# Patient Record
Sex: FEMALE | Race: WHITE | Employment: OTHER | URBAN - METROPOLITAN AREA
[De-identification: names, ages, dates, MRNs, and addresses within clinical notes are randomized per-mention and may not be internally consistent; named-entity substitution may affect disease eponyms.]

---

## 2018-11-20 ENCOUNTER — HOSPITAL ENCOUNTER (OUTPATIENT)
Dept: PHYSICAL THERAPY | Age: 68
Setting detail: THERAPIES SERIES
Discharge: HOME | End: 2018-11-20
Payer: COMMERCIAL

## 2018-11-20 PROCEDURE — G8979 MOBILITY GOAL STATUS: HCPCS

## 2018-11-20 PROCEDURE — G8978 MOBILITY CURRENT STATUS: HCPCS

## 2018-11-20 PROCEDURE — 97162 PT EVAL MOD COMPLEX 30 MIN: CPT

## 2018-11-20 PROCEDURE — G8980 MOBILITY D/C STATUS: HCPCS

## 2018-11-20 ASSESSMENT — PAIN DESCRIPTION - DESCRIPTORS: DESCRIPTORS: ACHING;SORE;CONSTANT

## 2018-11-20 ASSESSMENT — PAIN SCALES - GENERAL: PAINLEVEL_OUTOF10: 7

## 2018-11-20 ASSESSMENT — PAIN DESCRIPTION - LOCATION: LOCATION: NECK;BACK;SHOULDER;ARM

## 2019-06-13 LAB
ALBUMIN: 4.3 G/DL (ref 3.4–5)
ALP BLD-CCNC: 75 U/L (ref 33–136)
ALT SERPL-CCNC: 21 U/L (ref 7–45)
ANION GAP SERPL CALCULATED.3IONS-SCNC: 16 MMOL/L (ref 10–20)
AST SERPL-CCNC: 23 U/L (ref 9–39)
BICARBONATE: 28 MMOL/L (ref 21–32)
BILIRUB SERPL-MCNC: 0.4 MG/DL (ref 0–1.2)
BILIRUBIN DIRECT: 0.1 MG/DL (ref 0–0.3)
CALCIUM SERPL-MCNC: 9.9 MG/DL (ref 8.6–10.3)
CHLORIDE BLD-SCNC: 107 MMOL/L (ref 98–107)
CHOLESTEROL/HDL RATIO: 2.2
CHOLESTEROL: 152 MG/DL (ref 0–199)
CREAT SERPL-MCNC: 0.82 MG/DL (ref 0.5–1)
ERYTHROCYTE [DISTWIDTH] IN BLOOD BY AUTOMATED COUNT: 14.7 % (ref 11.5–14)
GFR AFRICAN AMERICAN: >60 ML/MIN/1.73M2
GFR NON-AFRICAN AMERICAN: >60 ML/MIN/1.73M2
GLUCOSE: 103 MG/DL (ref 74–99)
HCT VFR BLD CALC: 45.8 % (ref 36–46)
HDLC SERPL-MCNC: 70 MG/DL
HEMOGLOBIN: 14.1 G/DL (ref 12–16)
LDL CHOLESTEROL: 57 MG/DL (ref 0–99)
MCHC RBC AUTO-ENTMCNC: 30.8 G/DL (ref 32–36)
MCV RBC AUTO: 92 FL (ref 80–100)
PLATELET # BLD: 260 X10E9/L (ref 150–450)
POTASSIUM SERPL-SCNC: 4.5 MMOL/L (ref 3.5–5.3)
RBC # BLD: 4.96 X10E12/L (ref 4–5.2)
SODIUM BLD-SCNC: 146 MMOL/L (ref 136–145)
TOTAL PROTEIN: 7.1 G/DL (ref 6.4–8.2)
TRIGL SERPL-MCNC: 124 MG/DL (ref 0–149)
UREA NITROGEN: 18 MG/DL (ref 6–23)
VLDLC SERPL CALC-MCNC: 25 MG/DL (ref 0–40)
WBC: 10.4 X10E9/L (ref 4.4–11.3)

## 2019-12-02 DIAGNOSIS — G35 MULTIPLE SCLEROSIS (HCC): Primary | ICD-10-CM

## 2019-12-02 RX ORDER — DALFAMPRIDINE 10 MG/1
10 TABLET, FILM COATED, EXTENDED RELEASE ORAL EVERY 12 HOURS
Qty: 60 TABLET | Refills: 5 | Status: SHIPPED | OUTPATIENT
Start: 2019-12-02 | End: 2020-06-01

## 2019-12-18 RX ORDER — BACLOFEN 10 MG/1
10 TABLET ORAL 4 TIMES DAILY
Qty: 120 TABLET | Refills: 2 | Status: SHIPPED | OUTPATIENT
Start: 2019-12-18 | End: 2020-03-25

## 2020-02-17 ENCOUNTER — OFFICE VISIT (OUTPATIENT)
Dept: NEUROLOGY | Age: 70
End: 2020-02-17
Payer: COMMERCIAL

## 2020-02-17 VITALS — WEIGHT: 148.7 LBS

## 2020-02-17 PROBLEM — R26.0 ATAXIC GAIT: Status: ACTIVE | Noted: 2020-02-17

## 2020-02-17 PROBLEM — R32 INCONTINENCE: Status: ACTIVE | Noted: 2020-02-17

## 2020-02-17 PROBLEM — R41.3 MEMORY LOSS: Status: ACTIVE | Noted: 2020-02-17

## 2020-02-17 PROCEDURE — G8400 PT W/DXA NO RESULTS DOC: HCPCS | Performed by: PSYCHIATRY & NEUROLOGY

## 2020-02-17 PROCEDURE — 99214 OFFICE O/P EST MOD 30 MIN: CPT | Performed by: PSYCHIATRY & NEUROLOGY

## 2020-02-17 PROCEDURE — G8421 BMI NOT CALCULATED: HCPCS | Performed by: PSYCHIATRY & NEUROLOGY

## 2020-02-17 PROCEDURE — 3017F COLORECTAL CA SCREEN DOC REV: CPT | Performed by: PSYCHIATRY & NEUROLOGY

## 2020-02-17 PROCEDURE — 4004F PT TOBACCO SCREEN RCVD TLK: CPT | Performed by: PSYCHIATRY & NEUROLOGY

## 2020-02-17 PROCEDURE — 1123F ACP DISCUSS/DSCN MKR DOCD: CPT | Performed by: PSYCHIATRY & NEUROLOGY

## 2020-02-17 PROCEDURE — 4040F PNEUMOC VAC/ADMIN/RCVD: CPT | Performed by: PSYCHIATRY & NEUROLOGY

## 2020-02-17 PROCEDURE — 0509F URINE INCON PLAN DOCD: CPT | Performed by: PSYCHIATRY & NEUROLOGY

## 2020-02-17 PROCEDURE — G8484 FLU IMMUNIZE NO ADMIN: HCPCS | Performed by: PSYCHIATRY & NEUROLOGY

## 2020-02-17 PROCEDURE — 1090F PRES/ABSN URINE INCON ASSESS: CPT | Performed by: PSYCHIATRY & NEUROLOGY

## 2020-02-17 PROCEDURE — G8427 DOCREV CUR MEDS BY ELIG CLIN: HCPCS | Performed by: PSYCHIATRY & NEUROLOGY

## 2020-02-17 RX ORDER — NITROGLYCERIN 0.3 MG/1
0.3 TABLET SUBLINGUAL
COMMUNITY

## 2020-02-17 RX ORDER — ATORVASTATIN CALCIUM 80 MG/1
80 TABLET, FILM COATED ORAL
COMMUNITY
Start: 2014-05-21

## 2020-02-17 RX ORDER — TOLTERODINE 4 MG/1
4 CAPSULE, EXTENDED RELEASE ORAL
COMMUNITY
Start: 2014-05-21

## 2020-02-17 RX ORDER — VALSARTAN 80 MG/1
80 TABLET ORAL
COMMUNITY
Start: 2014-05-21

## 2020-02-17 RX ORDER — GLATIRAMER ACETATE 20 MG/ML
20 INJECTION, SOLUTION SUBCUTANEOUS
COMMUNITY
End: 2020-07-02

## 2020-02-17 RX ORDER — ESCITALOPRAM OXALATE 10 MG/1
10 TABLET ORAL DAILY
Qty: 30 TABLET | Refills: 3 | Status: SHIPPED | OUTPATIENT
Start: 2020-02-17 | End: 2021-01-13

## 2020-02-17 RX ORDER — ASPIRIN 81 MG/1
81 TABLET ORAL
COMMUNITY
Start: 2014-05-21

## 2020-02-17 RX ORDER — ASCORBIC ACID 500 MG
500 TABLET ORAL
COMMUNITY
Start: 2014-05-21

## 2020-02-17 RX ORDER — ALENDRONATE SODIUM 70 MG/1
70 TABLET ORAL
COMMUNITY
Start: 2014-05-21

## 2020-02-17 RX ORDER — METOPROLOL SUCCINATE 50 MG/1
50 TABLET, EXTENDED RELEASE ORAL
COMMUNITY
Start: 2015-05-26

## 2020-02-17 RX ORDER — CIPROFLOXACIN HYDROCHLORIDE 3.5 MG/ML
1 SOLUTION/ DROPS TOPICAL
COMMUNITY
Start: 2015-04-23

## 2020-02-17 RX ORDER — TRIHEXYPHENIDYL HYDROCHLORIDE 2 MG/1
2 TABLET ORAL 2 TIMES DAILY
Qty: 60 TABLET | Refills: 1 | Status: SHIPPED | OUTPATIENT
Start: 2020-02-17 | End: 2020-04-23 | Stop reason: SDUPTHER

## 2020-02-17 ASSESSMENT — ENCOUNTER SYMPTOMS
CHOKING: 0
SHORTNESS OF BREATH: 0
TROUBLE SWALLOWING: 0
PHOTOPHOBIA: 0
VOMITING: 0
NAUSEA: 0
BACK PAIN: 0

## 2020-02-18 ENCOUNTER — TELEPHONE (OUTPATIENT)
Dept: NEUROLOGY | Age: 70
End: 2020-02-18

## 2020-02-19 RX ORDER — DIPHENHYDRAMINE HYDROCHLORIDE 25 MG/1
5 TABLET ORAL 2 TIMES DAILY
Qty: 60 CAPSULE | Refills: 3 | Status: SHIPPED | OUTPATIENT
Start: 2020-02-19 | End: 2020-09-09 | Stop reason: SDUPTHER

## 2020-02-21 NOTE — TELEPHONE ENCOUNTER
Requested Prescriptions     Pending Prescriptions Disp Refills    Mouthwashes (BIOTENE) LIQD oral solution 59 mL 3     Sig: Swish and spit 15 mLs as needed

## 2020-02-23 RX ORDER — FLUORIDE TOOTHPASTE
15 TOOTHPASTE DENTAL PRN
Qty: 59 ML | Refills: 3 | Status: SHIPPED | OUTPATIENT
Start: 2020-02-23 | End: 2021-01-13

## 2020-03-17 NOTE — TELEPHONE ENCOUNTER
Patient has stopped artane, caregiver called today, stated big increase in muscle cramping and spasms. Has gotten very painful. Is there anything else that can be done? Please advise.

## 2020-03-18 RX ORDER — TIZANIDINE 4 MG/1
4 TABLET ORAL 2 TIMES DAILY
Qty: 60 TABLET | Refills: 0 | Status: SHIPPED | OUTPATIENT
Start: 2020-03-18 | End: 2020-03-25

## 2020-03-20 ENCOUNTER — TELEPHONE (OUTPATIENT)
Dept: NEUROLOGY | Age: 70
End: 2020-03-20

## 2020-03-20 NOTE — TELEPHONE ENCOUNTER
We received a fax with an order of d/c medications on it and it asked for us to please reply. I am unsure what we are supposed to apply with due to it only being a copy of an order.

## 2020-03-24 ENCOUNTER — TELEPHONE (OUTPATIENT)
Dept: NEUROLOGY | Age: 70
End: 2020-03-24

## 2020-03-25 RX ORDER — BACLOFEN 10 MG/1
TABLET ORAL
Qty: 120 TABLET | Refills: 2 | Status: SHIPPED | OUTPATIENT
Start: 2020-03-25 | End: 2020-06-26

## 2020-03-26 ENCOUNTER — TELEPHONE (OUTPATIENT)
Dept: NEUROLOGY | Age: 70
End: 2020-03-26

## 2020-03-26 NOTE — TELEPHONE ENCOUNTER
Amy Beavers called again and states that Natalie Friedman now wants to try artane 2mg BID . You gave it in February but she thought that taking the artane and the lexapro together was making her fall.  Sh we have now d/c her lexapro and zanaflex she is doing much better but now wants to try artane again

## 2020-04-23 RX ORDER — TRIHEXYPHENIDYL HYDROCHLORIDE 2 MG/1
2 TABLET ORAL 2 TIMES DAILY
Qty: 60 TABLET | Refills: 3 | Status: SHIPPED | OUTPATIENT
Start: 2020-04-23 | End: 2020-09-09 | Stop reason: SDUPTHER

## 2020-04-28 ENCOUNTER — TELEPHONE (OUTPATIENT)
Dept: NEUROLOGY | Age: 70
End: 2020-04-28

## 2020-04-28 NOTE — TELEPHONE ENCOUNTER
Artane was approved through cover my meds      CaseId:[id number];Status:Approved; Review Type:Prior Auth; Coverage Start Date:03/29/2020; Coverage End Date:04/28/2021;

## 2020-06-01 RX ORDER — DALFAMPRIDINE 10 MG/1
10 TABLET, FILM COATED, EXTENDED RELEASE ORAL EVERY 12 HOURS
Qty: 60 TABLET | Refills: 11 | Status: SHIPPED | OUTPATIENT
Start: 2020-06-01 | End: 2021-05-03

## 2020-06-09 LAB
ALBUMIN SERPL-MCNC: 4.2 G/DL (ref 3.5–4.6)
ALP BLD-CCNC: 109 U/L (ref 40–130)
ALT SERPL-CCNC: 19 U/L (ref 0–33)
ANION GAP SERPL CALCULATED.3IONS-SCNC: 11 MEQ/L (ref 9–15)
AST SERPL-CCNC: 20 U/L (ref 0–35)
BILIRUB SERPL-MCNC: <0.2 MG/DL (ref 0.2–0.7)
BILIRUBIN DIRECT: <0.2 MG/DL (ref 0–0.4)
BILIRUBIN, INDIRECT: NORMAL MG/DL (ref 0–0.6)
BUN BLDV-MCNC: 16 MG/DL (ref 8–23)
CALCIUM SERPL-MCNC: 9.8 MG/DL (ref 8.5–9.9)
CHLORIDE BLD-SCNC: 107 MEQ/L (ref 95–107)
CHOLESTEROL, TOTAL: 178 MG/DL (ref 0–199)
CO2: 25 MEQ/L (ref 20–31)
CREAT SERPL-MCNC: 0.65 MG/DL (ref 0.5–0.9)
GFR AFRICAN AMERICAN: >60
GFR NON-AFRICAN AMERICAN: >60
GLUCOSE BLD-MCNC: 104 MG/DL (ref 70–99)
HBA1C MFR BLD: 6.4 % (ref 4.8–5.9)
HCT VFR BLD CALC: 44.1 % (ref 37–47)
HDLC SERPL-MCNC: 88 MG/DL (ref 40–59)
HEMOGLOBIN: 14.2 G/DL (ref 12–16)
LDL CHOLESTEROL CALCULATED: 64 MG/DL (ref 0–129)
MCH RBC QN AUTO: 27.8 PG (ref 27–31.3)
MCHC RBC AUTO-ENTMCNC: 32.3 % (ref 33–37)
MCV RBC AUTO: 86.2 FL (ref 82–100)
PDW BLD-RTO: 16 % (ref 11.5–14.5)
PLATELET # BLD: 272 K/UL (ref 130–400)
POTASSIUM SERPL-SCNC: 3.8 MEQ/L (ref 3.4–4.9)
RBC # BLD: 5.12 M/UL (ref 4.2–5.4)
SODIUM BLD-SCNC: 143 MEQ/L (ref 135–144)
TOTAL PROTEIN: 7.1 G/DL (ref 6.3–8)
TRIGL SERPL-MCNC: 131 MG/DL (ref 0–150)
WBC # BLD: 6.2 K/UL (ref 4.8–10.8)

## 2020-06-26 RX ORDER — BACLOFEN 10 MG/1
TABLET ORAL
Qty: 120 TABLET | Refills: 2 | Status: SHIPPED | OUTPATIENT
Start: 2020-06-26 | End: 2020-10-01

## 2020-07-02 RX ORDER — GLATIRAMER ACETATE 20 MG/ML
INJECTION, SOLUTION SUBCUTANEOUS
Qty: 90 ML | Refills: 3 | Status: SHIPPED | OUTPATIENT
Start: 2020-07-02 | End: 2021-01-13 | Stop reason: SDUPTHER

## 2020-09-10 RX ORDER — DIPHENHYDRAMINE HYDROCHLORIDE 25 MG/1
5 TABLET ORAL 2 TIMES DAILY
Qty: 60 CAPSULE | Refills: 3 | Status: SHIPPED | OUTPATIENT
Start: 2020-09-10 | End: 2021-01-13 | Stop reason: SDUPTHER

## 2020-09-10 RX ORDER — TRIHEXYPHENIDYL HYDROCHLORIDE 2 MG/1
2 TABLET ORAL 2 TIMES DAILY
Qty: 60 TABLET | Refills: 3 | Status: SHIPPED | OUTPATIENT
Start: 2020-09-10 | End: 2021-02-08

## 2020-09-25 ENCOUNTER — TELEPHONE (OUTPATIENT)
Dept: NEUROLOGY | Age: 70
End: 2020-09-25

## 2020-09-25 NOTE — TELEPHONE ENCOUNTER
Submitted pa for copaxone  Via cover my meds       CaseId:[id number];Status:Approved; Review Type:Prior Auth; Coverage Start Date:08/26/2020; Coverage End Date:09/25/2021;      [facility] pharmacy was notified

## 2020-10-01 RX ORDER — BACLOFEN 10 MG/1
TABLET ORAL
Qty: 120 TABLET | Refills: 2 | Status: SHIPPED | OUTPATIENT
Start: 2020-10-01 | End: 2021-01-04

## 2021-01-04 RX ORDER — BACLOFEN 10 MG/1
TABLET ORAL
Qty: 36 TABLET | Refills: 0 | Status: SHIPPED | OUTPATIENT
Start: 2021-01-04 | End: 2021-01-13 | Stop reason: SDUPTHER

## 2021-01-07 PROBLEM — N39.42 URINARY INCONTINENCE WITHOUT SENSORY AWARENESS: Status: ACTIVE | Noted: 2020-02-17

## 2021-01-12 ENCOUNTER — TELEPHONE (OUTPATIENT)
Dept: NEUROLOGY | Age: 71
End: 2021-01-12

## 2021-01-12 NOTE — TELEPHONE ENCOUNTER
[address]    CaseId:[id number];Status:Approved; Review Type:Prior Auth; Coverage Start Date:01/01/2021; Coverage End Date:01/12/2022;

## 2021-01-13 ENCOUNTER — OFFICE VISIT (OUTPATIENT)
Dept: NEUROLOGY | Age: 71
End: 2021-01-13
Payer: COMMERCIAL

## 2021-01-13 VITALS
WEIGHT: 149.8 LBS | HEART RATE: 86 BPM | SYSTOLIC BLOOD PRESSURE: 135 MMHG | DIASTOLIC BLOOD PRESSURE: 75 MMHG | TEMPERATURE: 97.1 F

## 2021-01-13 DIAGNOSIS — N39.42 URINARY INCONTINENCE WITHOUT SENSORY AWARENESS: ICD-10-CM

## 2021-01-13 DIAGNOSIS — R41.3 MEMORY LOSS: ICD-10-CM

## 2021-01-13 DIAGNOSIS — R26.0 ATAXIC GAIT: ICD-10-CM

## 2021-01-13 DIAGNOSIS — G35 MULTIPLE SCLEROSIS (HCC): Primary | ICD-10-CM

## 2021-01-13 PROCEDURE — 1090F PRES/ABSN URINE INCON ASSESS: CPT | Performed by: PSYCHIATRY & NEUROLOGY

## 2021-01-13 PROCEDURE — 99214 OFFICE O/P EST MOD 30 MIN: CPT | Performed by: PSYCHIATRY & NEUROLOGY

## 2021-01-13 PROCEDURE — 1123F ACP DISCUSS/DSCN MKR DOCD: CPT | Performed by: PSYCHIATRY & NEUROLOGY

## 2021-01-13 PROCEDURE — 1036F TOBACCO NON-USER: CPT | Performed by: PSYCHIATRY & NEUROLOGY

## 2021-01-13 PROCEDURE — 0509F URINE INCON PLAN DOCD: CPT | Performed by: PSYCHIATRY & NEUROLOGY

## 2021-01-13 PROCEDURE — G8400 PT W/DXA NO RESULTS DOC: HCPCS | Performed by: PSYCHIATRY & NEUROLOGY

## 2021-01-13 PROCEDURE — G8484 FLU IMMUNIZE NO ADMIN: HCPCS | Performed by: PSYCHIATRY & NEUROLOGY

## 2021-01-13 PROCEDURE — G8427 DOCREV CUR MEDS BY ELIG CLIN: HCPCS | Performed by: PSYCHIATRY & NEUROLOGY

## 2021-01-13 PROCEDURE — 3017F COLORECTAL CA SCREEN DOC REV: CPT | Performed by: PSYCHIATRY & NEUROLOGY

## 2021-01-13 PROCEDURE — G8421 BMI NOT CALCULATED: HCPCS | Performed by: PSYCHIATRY & NEUROLOGY

## 2021-01-13 PROCEDURE — 4040F PNEUMOC VAC/ADMIN/RCVD: CPT | Performed by: PSYCHIATRY & NEUROLOGY

## 2021-01-13 RX ORDER — FUROSEMIDE 20 MG/1
TABLET ORAL
Qty: 5 TABLET | Refills: 0 | Status: SHIPPED | OUTPATIENT
Start: 2021-01-13 | End: 2021-02-08

## 2021-01-13 RX ORDER — DIPHENHYDRAMINE HYDROCHLORIDE 25 MG/1
5 TABLET ORAL 2 TIMES DAILY
Qty: 60 CAPSULE | Refills: 3 | Status: SHIPPED | OUTPATIENT
Start: 2021-01-13 | End: 2021-05-17

## 2021-01-13 RX ORDER — MAGNESIUM OXIDE 400 MG/1
TABLET ORAL
COMMUNITY

## 2021-01-13 RX ORDER — BACLOFEN 10 MG/1
10 TABLET ORAL 4 TIMES DAILY
Qty: 120 TABLET | Refills: 0 | Status: SHIPPED | OUTPATIENT
Start: 2021-01-13 | End: 2021-02-08

## 2021-01-13 RX ORDER — GLATIRAMER ACETATE 20 MG/ML
INJECTION, SOLUTION SUBCUTANEOUS
Qty: 90 ML | Refills: 3 | Status: SHIPPED | OUTPATIENT
Start: 2021-01-13 | End: 2021-07-19

## 2021-01-13 RX ORDER — FLUTICASONE PROPIONATE 50 MCG
SPRAY, SUSPENSION (ML) NASAL
COMMUNITY

## 2021-01-13 ASSESSMENT — ENCOUNTER SYMPTOMS
BACK PAIN: 0
TROUBLE SWALLOWING: 0
PHOTOPHOBIA: 0
SHORTNESS OF BREATH: 0
CHOKING: 0
VOMITING: 0
COLOR CHANGE: 0
NAUSEA: 0

## 2021-01-13 NOTE — PROGRESS NOTES
Subjective:      Patient ID: Junior Llamas is a 78 y.o. female who presents today for:  Chief Complaint   Patient presents with    Follow-up     Patient states that she has been fine. She said she forgets things all the time, her caregiver says that her tremors are worsening. Caregiver says that she had a fall around the end of October, says she didnt hurt anything. Caregiver says that her feet are swelling, and and says that her toenail fell off and not sure why. She says she has a rash developing on her hands. HPI 59-year-old right-handed female with a history of multiple sclerosis with gait ataxia with memory loss and incontinence. Patient is on Copaxone doing well. EDSS score though is 6-7 and she has not for those and she has a tremor in the right hand. She has spasticity and is responsive to baclofen she is on Detrol. Patient was not concerned about a daily injection of Copaxone. She is followed by visiting physicians and she has some rash and some mild the visiting physician does not treat this. States that the itching rash on the finger. Patient also has leg swelling as she is immobile. She does not ambulate much now she only is able to pivot. She has had a few falls. She is having some cognitive issues as well. She reports no side effects of Copaxone    No past medical history on file. No past surgical history on file.   Social History     Socioeconomic History    Marital status: Single     Spouse name: Not on file    Number of children: Not on file    Years of education: Not on file    Highest education level: Not on file   Occupational History    Not on file   Social Needs    Financial resource strain: Not on file    Food insecurity     Worry: Not on file     Inability: Not on file    Transportation needs     Medical: Not on file     Non-medical: Not on file   Tobacco Use    Smoking status: Former Smoker    Smokeless tobacco: Never Used   Substance and Sexual Activity    (67.9 kg)     Physical Exam  Vitals signs reviewed. Eyes:      Pupils: Pupils are equal, round, and reactive to light. Neck:      Musculoskeletal: Normal range of motion. Cardiovascular:      Rate and Rhythm: Normal rate and regular rhythm. Heart sounds: No murmur. Pulmonary:      Effort: Pulmonary effort is normal.      Breath sounds: Normal breath sounds. Abdominal:      General: Bowel sounds are normal.   Musculoskeletal: Normal range of motion. Skin:     General: Skin is warm. Neurological:      Mental Status: She is alert and oriented to person, place, and time. Cranial Nerves: No cranial nerve deficit. Sensory: No sensory deficit. Motor: No abnormal muscle tone. Coordination: Coordination normal.      Deep Tendon Reflexes: Reflexes are normal and symmetric. Babinski sign absent on the right side. Babinski sign absent on the left side. Psychiatric:         Mood and Affect: Mood normal.     Normal strength of 4/5 with a central gait ataxia with a cerebellar tremor up with dysarthria. 2+ pedal edema is notable she is areflexic at the ankle    No results found.     Lab Results   Component Value Date    WBC 6.2 06/09/2020    RBC 5.12 06/09/2020    RBC 4.55 05/08/2012    HGB 14.2 06/09/2020    HCT 44.1 06/09/2020    MCV 86.2 06/09/2020    MCH 27.8 06/09/2020    MCHC 32.3 06/09/2020    RDW 16.0 06/09/2020     06/09/2020    MPV 9.4 05/13/2015     Lab Results   Component Value Date     06/09/2020    K 3.8 06/09/2020     06/09/2020    CO2 25 06/09/2020    BUN 16 06/09/2020    CREATININE 0.65 06/09/2020    GFRAA >60.0 06/09/2020    LABGLOM >60.0 06/09/2020    GLUCOSE 104 06/09/2020    GLUCOSE 103 06/13/2019    PROT 7.1 06/09/2020    LABALBU 4.2 06/09/2020    LABALBU 4.3 06/13/2019    CALCIUM 9.8 06/09/2020    BILITOT <0.2 06/09/2020    ALKPHOS 109 06/09/2020    AST 20 06/09/2020    ALT 19 06/09/2020     No results found for: PROTIME, INR  Lab Results   Component Value Date    AHNYHNCT79 565 05/13/2015     Lab Results   Component Value Date    TRIG 131 06/09/2020    HDL 88 06/09/2020    LDLCALC 64 06/09/2020    LABVLDL 25 06/13/2019     No results found for: LABAMPH, BARBSCNU, LABBENZ, CANNAB, COCAINESCRN, LABMETH, OPIATESCREENURINE, PHENCYCLIDINESCREENURINE, PPXUR, ETOH  No results found for: LITHIUM, DILFRTOT, VALPROATE    Assessment:       Diagnosis Orders   1. Multiple sclerosis (HCC)     2. Ataxic gait     3. Memory loss     4. Urinary incontinence without sensory awareness     Double sclerosis with a EDSS score 627. Wishes is mostly wheelchair-bound and only able to pivot. She slowly declined and ended of phase of chronic secondary progressive primary progressive MS. Patient is on Copaxone and would like to stay on the Copaxone we had discussed higher efficacy medication but she has been on this for many years and would like to stay on this. Patient continues on Ampyra for her gait. Next well patient is having signal swelling in the legs we will give her a short course of Lasix. She does not truly have a primary care doctor she is followed by visiting physicians. She has a rash which could be fungal I recommended that she try over-the-counter clotrimazole if not she should be seen by primary care or dermatology. Patient is recommended to wear stockings that may help her edema and I recommend that she raise her legs up at night this may drain the dependent edema. Patient is having more cognitive issues are expected for multiple sclerosis in this age group. We will keep an eye on this and follow her. Plan:      No orders of the defined types were placed in this encounter.     Orders Placed This Encounter   Medications    baclofen (LIORESAL) 10 MG tablet     Sig: Take 1 tablet by mouth 4 times daily     Dispense:  120 tablet     Refill:  0    Biotin (BIOTIN 5000) 5 MG CAPS     Sig: Take 5 mg by mouth 2 times daily     Dispense:  60 capsule     Refill: 3    COPAXONE 20 MG/ML injection     Sig: INJECT ONE ML UNDER THE SKIN EVERY DAY     Dispense:  90 mL     Refill:  3    magnesium oxide (MAGOX 400) 400 (241.3 Mg) MG TABS tablet     Sig: Take 1 tablet by mouth daily     Dispense:  30 tablet     Refill:  1    furosemide (LASIX) 20 MG tablet     Sig: Half daily for 10 days     Dispense:  5 tablet     Refill:  0    Compression Stockings MISC     Sig: by Does not apply route Edema, medium     Dispense:  1 each     Refill:  0       Return in about 4 months (around 5/13/2021).       Ellen Noyola MD

## 2021-02-08 RX ORDER — BACLOFEN 10 MG/1
TABLET ORAL
Qty: 120 TABLET | Refills: 0 | Status: SHIPPED | OUTPATIENT
Start: 2021-02-08 | End: 2021-03-08

## 2021-02-08 RX ORDER — FUROSEMIDE 20 MG/1
TABLET ORAL
Qty: 5 TABLET | Refills: 0 | Status: SHIPPED | OUTPATIENT
Start: 2021-02-08

## 2021-02-08 RX ORDER — TRIHEXYPHENIDYL HYDROCHLORIDE 2 MG/1
TABLET ORAL
Qty: 60 TABLET | Refills: 1 | Status: SHIPPED | OUTPATIENT
Start: 2021-02-08 | End: 2021-04-12

## 2021-03-03 ENCOUNTER — TELEPHONE (OUTPATIENT)
Dept: NEUROLOGY | Age: 71
End: 2021-03-03

## 2021-03-03 NOTE — TELEPHONE ENCOUNTER
Keon from [facility] home health called, stating that patient had 2 falls 2 weeks ago, her forgetfulness is increasing and she is unusually irritable. She was last seen 1/13/2021 in office and they're suspecting that she is having an exacerbation. Her PCP with [facility] said that Neurology would need to address. Please advise thank you.

## 2021-03-08 RX ORDER — BACLOFEN 10 MG/1
TABLET ORAL
Qty: 120 TABLET | Refills: 0 | Status: SHIPPED | OUTPATIENT
Start: 2021-03-08 | End: 2021-04-12

## 2021-03-25 LAB
ALBUMIN SERPL-MCNC: 4.1 G/DL (ref 3.5–4.6)
ALP BLD-CCNC: 116 U/L (ref 40–130)
ALT SERPL-CCNC: 16 U/L (ref 0–33)
ANION GAP SERPL CALCULATED.3IONS-SCNC: 14 MEQ/L (ref 9–15)
AST SERPL-CCNC: 18 U/L (ref 0–35)
BILIRUB SERPL-MCNC: 0.3 MG/DL (ref 0.2–0.7)
BILIRUBIN DIRECT: <0.2 MG/DL (ref 0–0.4)
BILIRUBIN, INDIRECT: NORMAL MG/DL (ref 0–0.6)
BUN BLDV-MCNC: 14 MG/DL (ref 8–23)
CALCIUM SERPL-MCNC: 9.2 MG/DL (ref 8.5–9.9)
CHLORIDE BLD-SCNC: 107 MEQ/L (ref 95–107)
CHOLESTEROL, TOTAL: 139 MG/DL (ref 0–199)
CO2: 25 MEQ/L (ref 20–31)
CREAT SERPL-MCNC: 0.64 MG/DL (ref 0.5–0.9)
GFR AFRICAN AMERICAN: >60
GFR NON-AFRICAN AMERICAN: >60
GLUCOSE BLD-MCNC: 94 MG/DL (ref 70–99)
HCT VFR BLD CALC: 39 % (ref 37–47)
HDLC SERPL-MCNC: 57 MG/DL (ref 40–59)
HEMOGLOBIN: 12.7 G/DL (ref 12–16)
LDL CHOLESTEROL CALCULATED: 55 MG/DL (ref 0–129)
MAGNESIUM: 2 MG/DL (ref 1.7–2.4)
MCH RBC QN AUTO: 27.3 PG (ref 27–31.3)
MCHC RBC AUTO-ENTMCNC: 32.5 % (ref 33–37)
MCV RBC AUTO: 84 FL (ref 82–100)
PDW BLD-RTO: 15.9 % (ref 11.5–14.5)
PLATELET # BLD: 297 K/UL (ref 130–400)
POTASSIUM SERPL-SCNC: 3.6 MEQ/L (ref 3.4–4.9)
RBC # BLD: 4.64 M/UL (ref 4.2–5.4)
SODIUM BLD-SCNC: 146 MEQ/L (ref 135–144)
TOTAL PROTEIN: 7.2 G/DL (ref 6.3–8)
TRIGL SERPL-MCNC: 137 MG/DL (ref 0–150)
TSH SERPL DL<=0.05 MIU/L-ACNC: 1.18 UIU/ML (ref 0.44–3.86)
WBC # BLD: 7.6 K/UL (ref 4.8–10.8)

## 2021-04-12 RX ORDER — TRIHEXYPHENIDYL HYDROCHLORIDE 2 MG/1
TABLET ORAL
Qty: 60 TABLET | Refills: 1 | Status: SHIPPED | OUTPATIENT
Start: 2021-04-12 | End: 2021-06-15

## 2021-04-12 RX ORDER — BACLOFEN 10 MG/1
TABLET ORAL
Qty: 120 TABLET | Refills: 0 | Status: SHIPPED | OUTPATIENT
Start: 2021-04-12 | End: 2021-05-17

## 2021-04-14 PROBLEM — N32.89 SPASM OF BLADDER: Status: ACTIVE | Noted: 2021-04-14

## 2021-04-14 PROBLEM — R92.8 ABNORMAL MAMMOGRAM: Status: ACTIVE | Noted: 2021-04-14

## 2021-04-14 PROBLEM — J30.9 ALLERGIC RHINITIS: Status: ACTIVE | Noted: 2021-04-14

## 2021-04-14 PROBLEM — K21.9 GASTROESOPHAGEAL REFLUX DISEASE: Status: ACTIVE | Noted: 2021-04-14

## 2021-04-14 PROBLEM — E78.5 HYPERLIPIDEMIA: Status: ACTIVE | Noted: 2021-04-14

## 2021-04-14 PROBLEM — M81.0 OSTEOPOROSIS: Status: ACTIVE | Noted: 2021-04-14

## 2021-04-14 PROBLEM — L20.9 ATOPIC DERMATITIS: Status: ACTIVE | Noted: 2021-04-14

## 2021-05-01 DIAGNOSIS — G35 MULTIPLE SCLEROSIS (HCC): ICD-10-CM

## 2021-05-03 RX ORDER — DALFAMPRIDINE 10 MG/1
10 TABLET, FILM COATED, EXTENDED RELEASE ORAL EVERY 12 HOURS
Qty: 60 TABLET | Refills: 11 | Status: SHIPPED | OUTPATIENT
Start: 2021-05-03 | End: 2021-06-02

## 2021-05-05 ENCOUNTER — TELEPHONE (OUTPATIENT)
Dept: NEUROLOGY | Age: 71
End: 2021-05-05

## 2021-05-05 NOTE — TELEPHONE ENCOUNTER
lore from [facility] home health care called states that patient is having increased confusion she states that she has pills that are missing and that patient states she hadn't taken.  Is there anything that can be done for her

## 2021-05-11 RX ORDER — DONEPEZIL HYDROCHLORIDE 5 MG/1
5 TABLET, FILM COATED ORAL NIGHTLY
Qty: 30 TABLET | Refills: 3 | Status: SHIPPED | OUTPATIENT
Start: 2021-05-11 | End: 2021-09-17

## 2021-05-11 NOTE — TELEPHONE ENCOUNTER
Care giver states that she was actually asking if there is anything else the patient is able to start taking because of the fact that her memory and confusion is worsening. She is currently taking copaxone. She says they were just concerned and wanted to make you aware of her worsening condition. Please advise.

## 2021-05-11 NOTE — TELEPHONE ENCOUNTER
There is no particular treatment for cognitive changes multiple sclerosis.   We can start her on Aricept 5 mg please pend this to me

## 2021-05-11 NOTE — TELEPHONE ENCOUNTER
Requested Prescriptions     Pending Prescriptions Disp Refills    donepezil (ARICEPT) 5 MG tablet 30 tablet 3     Sig: Take 1 tablet by mouth nightly     Per phone message.

## 2021-05-17 RX ORDER — BACLOFEN 10 MG/1
TABLET ORAL
Qty: 120 TABLET | Refills: 0 | Status: SHIPPED | OUTPATIENT
Start: 2021-05-17 | End: 2021-06-15

## 2021-05-17 RX ORDER — DIPHENHYDRAMINE HYDROCHLORIDE 25 MG/1
TABLET ORAL
Qty: 60 CAPSULE | Refills: 3 | Status: SHIPPED | OUTPATIENT
Start: 2021-05-17 | End: 2021-09-17

## 2021-05-19 PROBLEM — G35 MS (MULTIPLE SCLEROSIS) (HCC): Status: ACTIVE | Noted: 2021-05-19

## 2021-05-25 ENCOUNTER — TELEPHONE (OUTPATIENT)
Dept: NEUROLOGY | Age: 71
End: 2021-05-25

## 2021-05-26 ENCOUNTER — TELEPHONE (OUTPATIENT)
Dept: NEUROLOGY | Age: 71
End: 2021-05-26

## 2021-05-31 RX ORDER — MEMANTINE HYDROCHLORIDE 5 MG/1
5 TABLET ORAL 2 TIMES DAILY
Qty: 60 TABLET | Refills: 0 | Status: SHIPPED | OUTPATIENT
Start: 2021-05-31 | End: 2021-07-06

## 2021-06-01 NOTE — TELEPHONE ENCOUNTER
Maria Luisa Chu, called back stating that Deo Marino is refusing to go to ER and wanted to know if you could put in an order for labs to see what is going on with her.   Please advise  Thanks  Tiarra Nicole

## 2021-06-02 NOTE — TELEPHONE ENCOUNTER
I can do the labs but cannot do a CT head at the nursing home pend me CBC differential SMA-7 urine analysis

## 2021-06-03 DIAGNOSIS — G35 MS (MULTIPLE SCLEROSIS) (HCC): Primary | ICD-10-CM

## 2021-06-04 ENCOUNTER — APPOINTMENT (OUTPATIENT)
Dept: GENERAL RADIOLOGY | Age: 71
End: 2021-06-04
Payer: COMMERCIAL

## 2021-06-04 ENCOUNTER — APPOINTMENT (OUTPATIENT)
Dept: CT IMAGING | Age: 71
End: 2021-06-04
Payer: COMMERCIAL

## 2021-06-04 ENCOUNTER — HOSPITAL ENCOUNTER (EMERGENCY)
Age: 71
Discharge: HOME | End: 2021-06-04
Payer: COMMERCIAL

## 2021-06-04 VITALS
RESPIRATION RATE: 16 BRPM | TEMPERATURE: 98.2 F | OXYGEN SATURATION: 99 % | HEART RATE: 78 BPM | WEIGHT: 180 LBS | DIASTOLIC BLOOD PRESSURE: 35 MMHG | SYSTOLIC BLOOD PRESSURE: 100 MMHG

## 2021-06-04 DIAGNOSIS — N39.0 URINARY TRACT INFECTION WITH HEMATURIA, SITE UNSPECIFIED: Primary | ICD-10-CM

## 2021-06-04 DIAGNOSIS — G35 MS (MULTIPLE SCLEROSIS) (HCC): ICD-10-CM

## 2021-06-04 DIAGNOSIS — R31.9 URINARY TRACT INFECTION WITH HEMATURIA, SITE UNSPECIFIED: Primary | ICD-10-CM

## 2021-06-04 LAB
ALBUMIN SERPL-MCNC: 4.1 G/DL (ref 3.5–4.6)
ALP BLD-CCNC: 118 U/L (ref 40–130)
ALT SERPL-CCNC: 18 U/L (ref 0–33)
AMPHETAMINE SCREEN, URINE: NORMAL
ANION GAP SERPL CALCULATED.3IONS-SCNC: 11 MEQ/L (ref 9–15)
AST SERPL-CCNC: 20 U/L (ref 0–35)
BACTERIA: ABNORMAL /HPF
BARBITURATE SCREEN URINE: NORMAL
BASOPHILS ABSOLUTE: 0.2 K/UL (ref 0–0.2)
BASOPHILS RELATIVE PERCENT: 2.2 %
BENZODIAZEPINE SCREEN, URINE: NORMAL
BILIRUB SERPL-MCNC: <0.2 MG/DL (ref 0.2–0.7)
BILIRUBIN URINE: NEGATIVE
BLOOD, URINE: ABNORMAL
BUN BLDV-MCNC: 14 MG/DL (ref 8–23)
C-REACTIVE PROTEIN, HIGH SENSITIVITY: 4.6 MG/L (ref 0–5)
CALCIUM SERPL-MCNC: 9.5 MG/DL (ref 8.5–9.9)
CANNABINOID SCREEN URINE: NORMAL
CHLORIDE BLD-SCNC: 108 MEQ/L (ref 95–107)
CLARITY: CLEAR
CO2: 26 MEQ/L (ref 20–31)
COCAINE METABOLITE SCREEN URINE: NORMAL
COLOR: YELLOW
CREAT SERPL-MCNC: 0.95 MG/DL (ref 0.5–0.9)
EOSINOPHILS ABSOLUTE: 0.2 K/UL (ref 0–0.7)
EOSINOPHILS RELATIVE PERCENT: 2.1 %
EPITHELIAL CELLS, UA: ABNORMAL /HPF (ref 0–5)
ETHANOL PERCENT: NORMAL G/DL
ETHANOL: <10 MG/DL (ref 0–0.08)
GFR AFRICAN AMERICAN: >60
GFR NON-AFRICAN AMERICAN: 58.3
GLOBULIN: 3.2 G/DL (ref 2.3–3.5)
GLUCOSE BLD-MCNC: 131 MG/DL (ref 70–99)
GLUCOSE URINE: NEGATIVE MG/DL
HCT VFR BLD CALC: 38 % (ref 37–47)
HEMOGLOBIN: 12.5 G/DL (ref 12–16)
HYALINE CASTS: ABNORMAL /HPF (ref 0–5)
KETONES, URINE: NEGATIVE MG/DL
LEUKOCYTE ESTERASE, URINE: ABNORMAL
LYMPHOCYTES ABSOLUTE: 1.9 K/UL (ref 1–4.8)
LYMPHOCYTES RELATIVE PERCENT: 23 %
Lab: NORMAL
MAGNESIUM: 2 MG/DL (ref 1.7–2.4)
MCH RBC QN AUTO: 27.4 PG (ref 27–31.3)
MCHC RBC AUTO-ENTMCNC: 33 % (ref 33–37)
MCV RBC AUTO: 83.1 FL (ref 82–100)
METHADONE SCREEN, URINE: NORMAL
MONOCYTES ABSOLUTE: 0.5 K/UL (ref 0.2–0.8)
MONOCYTES RELATIVE PERCENT: 6.3 %
NEUTROPHILS ABSOLUTE: 5.5 K/UL (ref 1.4–6.5)
NEUTROPHILS RELATIVE PERCENT: 66.4 %
NITRITE, URINE: POSITIVE
OPIATE SCREEN URINE: NORMAL
OXYCODONE URINE: NORMAL
PDW BLD-RTO: 16.4 % (ref 11.5–14.5)
PH UA: 6.5 (ref 5–9)
PHENCYCLIDINE SCREEN URINE: NORMAL
PLATELET # BLD: 274 K/UL (ref 130–400)
POTASSIUM SERPL-SCNC: 3.8 MEQ/L (ref 3.4–4.9)
PROPOXYPHENE SCREEN: NORMAL
PROTEIN UA: ABNORMAL MG/DL
RBC # BLD: 4.57 M/UL (ref 4.2–5.4)
RBC UA: ABNORMAL /HPF (ref 0–5)
SEDIMENTATION RATE, ERYTHROCYTE: 33 MM (ref 0–30)
SODIUM BLD-SCNC: 145 MEQ/L (ref 135–144)
SPECIFIC GRAVITY UA: 1.01 (ref 1–1.03)
TOTAL PROTEIN: 7.3 G/DL (ref 6.3–8)
TROPONIN: <0.01 NG/ML (ref 0–0.01)
URINE REFLEX TO CULTURE: YES
UROBILINOGEN, URINE: 0.2 E.U./DL
WBC # BLD: 8.3 K/UL (ref 4.8–10.8)
WBC UA: ABNORMAL /HPF (ref 0–5)

## 2021-06-04 PROCEDURE — 80307 DRUG TEST PRSMV CHEM ANLYZR: CPT

## 2021-06-04 PROCEDURE — 86141 C-REACTIVE PROTEIN HS: CPT

## 2021-06-04 PROCEDURE — 83735 ASSAY OF MAGNESIUM: CPT

## 2021-06-04 PROCEDURE — 99284 EMERGENCY DEPT VISIT MOD MDM: CPT

## 2021-06-04 PROCEDURE — 85652 RBC SED RATE AUTOMATED: CPT

## 2021-06-04 PROCEDURE — 84484 ASSAY OF TROPONIN QUANT: CPT

## 2021-06-04 PROCEDURE — 70450 CT HEAD/BRAIN W/O DYE: CPT

## 2021-06-04 PROCEDURE — 87077 CULTURE AEROBIC IDENTIFY: CPT

## 2021-06-04 PROCEDURE — 82077 ASSAY SPEC XCP UR&BREATH IA: CPT

## 2021-06-04 PROCEDURE — 6370000000 HC RX 637 (ALT 250 FOR IP): Performed by: PHYSICIAN ASSISTANT

## 2021-06-04 PROCEDURE — 85025 COMPLETE CBC W/AUTO DIFF WBC: CPT

## 2021-06-04 PROCEDURE — 87186 SC STD MICRODIL/AGAR DIL: CPT

## 2021-06-04 PROCEDURE — 80053 COMPREHEN METABOLIC PANEL: CPT

## 2021-06-04 PROCEDURE — 71045 X-RAY EXAM CHEST 1 VIEW: CPT

## 2021-06-04 PROCEDURE — 36415 COLL VENOUS BLD VENIPUNCTURE: CPT

## 2021-06-04 PROCEDURE — 87086 URINE CULTURE/COLONY COUNT: CPT

## 2021-06-04 PROCEDURE — 81003 URINALYSIS AUTO W/O SCOPE: CPT

## 2021-06-04 RX ORDER — NITROFURANTOIN 25; 75 MG/1; MG/1
100 CAPSULE ORAL ONCE
Status: COMPLETED | OUTPATIENT
Start: 2021-06-04 | End: 2021-06-04

## 2021-06-04 RX ORDER — NITROFURANTOIN 25; 75 MG/1; MG/1
100 CAPSULE ORAL 2 TIMES DAILY
Qty: 7 CAPSULE | Refills: 0 | Status: SHIPPED | OUTPATIENT
Start: 2021-06-04 | End: 2021-06-08

## 2021-06-04 RX ADMIN — NITROFURANTOIN (MONOHYDRATE/MACROCRYSTALS) 100 MG: 75; 25 CAPSULE ORAL at 18:25

## 2021-06-04 ASSESSMENT — ENCOUNTER SYMPTOMS
APNEA: 0
NAUSEA: 0
PHOTOPHOBIA: 0
EYE DISCHARGE: 0
SHORTNESS OF BREATH: 0
VOICE CHANGE: 0
VOMITING: 0
ABDOMINAL DISTENTION: 0
COUGH: 0
ANAL BLEEDING: 0
BACK PAIN: 1

## 2021-06-04 ASSESSMENT — PAIN DESCRIPTION - PAIN TYPE: TYPE: CHRONIC PAIN

## 2021-06-04 ASSESSMENT — PAIN DESCRIPTION - DESCRIPTORS: DESCRIPTORS: ACHING

## 2021-06-04 ASSESSMENT — PAIN SCALES - GENERAL: PAINLEVEL_OUTOF10: 8

## 2021-06-04 ASSESSMENT — PAIN DESCRIPTION - LOCATION: LOCATION: BACK

## 2021-06-04 NOTE — ED TRIAGE NOTES
Patient presents via EMS, sent by Dr. Madeleine Wong, wants patient to have CT scan of her head d/t recent falls. It is reported by EMS that patient has fallen about 4 times in the last week. Patient denies falling or injuring herself today, states she slid out of her wheelchair. Patient denies pain or other complaints.  No distress noted on arrival.

## 2021-06-04 NOTE — ED NOTES
Bed: 27  Expected date: 6/4/21  Expected time: 2:20 PM  Means of arrival: [facility]  Comments:  73 F - fell a couple times this week.      Babita Moreira RN  06/04/21 9033

## 2021-06-07 LAB
ORGANISM: ABNORMAL
URINE CULTURE, ROUTINE: ABNORMAL

## 2021-06-07 NOTE — TELEPHONE ENCOUNTER
Looks like refugio pended orders to you I will see what I can do about disregarding them due to her being in the hosp and having them done.

## 2021-06-15 RX ORDER — TRIHEXYPHENIDYL HYDROCHLORIDE 2 MG/1
TABLET ORAL
Qty: 60 TABLET | Refills: 1 | Status: SHIPPED | OUTPATIENT
Start: 2021-06-15 | End: 2021-08-19

## 2021-06-15 RX ORDER — BACLOFEN 10 MG/1
TABLET ORAL
Qty: 120 TABLET | Refills: 0 | Status: SHIPPED | OUTPATIENT
Start: 2021-06-15 | End: 2021-07-19

## 2021-07-01 PROBLEM — I25.10 ARTERIOSCLEROSIS OF CORONARY ARTERY: Status: ACTIVE | Noted: 2018-06-07

## 2021-07-01 PROBLEM — I10 HYPERTENSION: Status: ACTIVE | Noted: 2021-07-01

## 2021-07-06 RX ORDER — MEMANTINE HYDROCHLORIDE 5 MG/1
TABLET ORAL
Qty: 60 TABLET | Refills: 0 | Status: SHIPPED | OUTPATIENT
Start: 2021-07-06 | End: 2021-07-19

## 2021-07-19 RX ORDER — BACLOFEN 10 MG/1
TABLET ORAL
Qty: 120 TABLET | Refills: 0 | Status: SHIPPED | OUTPATIENT
Start: 2021-07-19 | End: 2021-08-19

## 2021-07-19 RX ORDER — GLATIRAMER ACETATE 20 MG/ML
INJECTION, SOLUTION SUBCUTANEOUS
Qty: 1 KIT | Refills: 3 | Status: SHIPPED | OUTPATIENT
Start: 2021-07-19

## 2021-07-19 RX ORDER — MEMANTINE HYDROCHLORIDE 5 MG/1
TABLET ORAL
Qty: 60 TABLET | Refills: 0 | Status: SHIPPED | OUTPATIENT
Start: 2021-07-19 | End: 2021-09-03

## 2021-08-19 RX ORDER — BACLOFEN 10 MG/1
TABLET ORAL
Qty: 120 TABLET | Refills: 0 | Status: SHIPPED | OUTPATIENT
Start: 2021-08-19 | End: 2021-09-17

## 2021-08-19 RX ORDER — TRIHEXYPHENIDYL HYDROCHLORIDE 2 MG/1
TABLET ORAL
Qty: 60 TABLET | Refills: 1 | Status: SHIPPED | OUTPATIENT
Start: 2021-08-19

## 2021-08-27 ENCOUNTER — TELEPHONE (OUTPATIENT)
Dept: NEUROLOGY | Age: 71
End: 2021-08-27

## 2021-08-27 NOTE — TELEPHONE ENCOUNTER
Copaxone approved via cover  meds      CaseId:[id number];Status:Approved; Review Type:Prior Auth; Coverage Start Date:07/28/2021; Coverage End Date:08/27/2022;

## 2021-09-03 RX ORDER — MEMANTINE HYDROCHLORIDE 5 MG/1
TABLET ORAL
Qty: 60 TABLET | Refills: 0 | Status: SHIPPED | OUTPATIENT
Start: 2021-09-03 | End: 2021-10-04

## 2021-09-17 RX ORDER — BACLOFEN 10 MG/1
TABLET ORAL
Qty: 120 TABLET | Refills: 0 | Status: SHIPPED | OUTPATIENT
Start: 2021-09-17

## 2021-09-17 RX ORDER — DONEPEZIL HYDROCHLORIDE 5 MG/1
TABLET, FILM COATED ORAL
Qty: 30 TABLET | Refills: 3 | Status: SHIPPED | OUTPATIENT
Start: 2021-09-17

## 2021-09-17 RX ORDER — DIPHENHYDRAMINE HYDROCHLORIDE 25 MG/1
TABLET ORAL
Qty: 60 CAPSULE | Refills: 3 | Status: SHIPPED | OUTPATIENT
Start: 2021-09-17

## 2021-10-04 RX ORDER — MEMANTINE HYDROCHLORIDE 5 MG/1
TABLET ORAL
Qty: 60 TABLET | Refills: 0 | Status: SHIPPED | OUTPATIENT
Start: 2021-10-04 | End: 2021-11-05

## 2021-11-05 RX ORDER — MEMANTINE HYDROCHLORIDE 5 MG/1
TABLET ORAL
Qty: 60 TABLET | Refills: 0 | Status: SHIPPED | OUTPATIENT
Start: 2021-11-05 | End: 2021-12-01

## 2021-12-01 RX ORDER — MEMANTINE HYDROCHLORIDE 5 MG/1
TABLET ORAL
Qty: 60 TABLET | Refills: 0 | Status: SHIPPED | OUTPATIENT
Start: 2021-12-01

## 2022-03-15 RX ORDER — BACLOFEN 10 MG/1
TABLET ORAL
Qty: 120 TABLET | Refills: 3 | OUTPATIENT
Start: 2022-03-15

## 2022-03-26 ENCOUNTER — APPOINTMENT (OUTPATIENT)
Dept: GENERAL RADIOLOGY | Age: 72
DRG: 083 | End: 2022-03-26
Payer: COMMERCIAL

## 2022-03-26 ENCOUNTER — APPOINTMENT (OUTPATIENT)
Dept: CT IMAGING | Age: 72
DRG: 083 | End: 2022-03-26
Payer: COMMERCIAL

## 2022-03-26 ENCOUNTER — HOSPITAL ENCOUNTER (INPATIENT)
Age: 72
LOS: 1 days | Discharge: ANOTHER HOSPITAL | DRG: 083 | End: 2022-03-26
Attending: STUDENT IN AN ORGANIZED HEALTH CARE EDUCATION/TRAINING PROGRAM | Admitting: SURGERY
Payer: COMMERCIAL

## 2022-03-26 VITALS
BODY MASS INDEX: 31.89 KG/M2 | HEART RATE: 97 BPM | DIASTOLIC BLOOD PRESSURE: 95 MMHG | WEIGHT: 180 LBS | RESPIRATION RATE: 19 BRPM | OXYGEN SATURATION: 98 % | HEIGHT: 63 IN | TEMPERATURE: 97.5 F | SYSTOLIC BLOOD PRESSURE: 116 MMHG

## 2022-03-26 DIAGNOSIS — S06.5XAA SDH (SUBDURAL HEMATOMA): Primary | ICD-10-CM
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
## 2022-03-26 DIAGNOSIS — S01.81XA FACIAL LACERATION, INITIAL ENCOUNTER: ICD-10-CM

## 2022-03-26 DIAGNOSIS — R41.82 ALTERED MENTAL STATUS, UNSPECIFIED ALTERED MENTAL STATUS TYPE: ICD-10-CM

## 2022-03-26 LAB
ABO/RH: NORMAL
ALBUMIN SERPL-MCNC: 4.1 G/DL (ref 3.5–4.6)
ALP BLD-CCNC: 101 U/L (ref 40–130)
ALT SERPL-CCNC: 15 U/L (ref 0–33)
ANION GAP SERPL CALCULATED.3IONS-SCNC: 14 MEQ/L (ref 9–15)
ANTIBODY SCREEN: NORMAL
APTT: 23.9 SEC (ref 24.4–36.8)
AST SERPL-CCNC: 20 U/L (ref 0–35)
BACTERIA: ABNORMAL /HPF
BASOPHILS ABSOLUTE: 0.1 K/UL (ref 0–0.2)
BASOPHILS RELATIVE PERCENT: 0.6 %
BILIRUB SERPL-MCNC: <0.2 MG/DL (ref 0.2–0.7)
BILIRUBIN URINE: NEGATIVE
BLOOD, URINE: NEGATIVE
BUN BLDV-MCNC: 26 MG/DL (ref 8–23)
CALCIUM SERPL-MCNC: 9.7 MG/DL (ref 8.5–9.9)
CHLORIDE BLD-SCNC: 105 MEQ/L (ref 95–107)
CLARITY: ABNORMAL
CO2: 22 MEQ/L (ref 20–31)
COLOR: YELLOW
CREAT SERPL-MCNC: 0.85 MG/DL (ref 0.5–0.9)
EOSINOPHILS ABSOLUTE: 0 K/UL (ref 0–0.7)
EOSINOPHILS RELATIVE PERCENT: 0 %
EPITHELIAL CELLS, UA: ABNORMAL /HPF (ref 0–5)
GFR AFRICAN AMERICAN: >60
GFR NON-AFRICAN AMERICAN: >60
GLOBULIN: 2.8 G/DL (ref 2.3–3.5)
GLUCOSE BLD-MCNC: 131 MG/DL
GLUCOSE BLD-MCNC: 141 MG/DL (ref 70–99)
GLUCOSE URINE: NEGATIVE MG/DL
HCT VFR BLD CALC: 40.3 % (ref 37–47)
HEMOGLOBIN: 12.9 G/DL (ref 12–16)
HYALINE CASTS: ABNORMAL /HPF (ref 0–5)
INFLUENZA A BY PCR: NEGATIVE
INFLUENZA B BY PCR: NEGATIVE
INR BLD: 1
KETONES, URINE: ABNORMAL MG/DL
LACTIC ACID: 2.7 MMOL/L (ref 0.5–2.2)
LEUKOCYTE ESTERASE, URINE: ABNORMAL
LYMPHOCYTES ABSOLUTE: 1 K/UL (ref 1–4.8)
LYMPHOCYTES RELATIVE PERCENT: 8.3 %
MAGNESIUM: 2.3 MG/DL (ref 1.7–2.4)
MCH RBC QN AUTO: 27.5 PG (ref 27–31.3)
MCHC RBC AUTO-ENTMCNC: 32.1 % (ref 33–37)
MCV RBC AUTO: 85.7 FL (ref 82–100)
MONOCYTES ABSOLUTE: 0.5 K/UL (ref 0.2–0.8)
MONOCYTES RELATIVE PERCENT: 4.2 %
NEUTROPHILS ABSOLUTE: 10.9 K/UL (ref 1.4–6.5)
NEUTROPHILS RELATIVE PERCENT: 86.9 %
NITRITE, URINE: NEGATIVE
PDW BLD-RTO: 15.3 % (ref 11.5–14.5)
PH UA: 5 (ref 5–9)
PLATELET # BLD: 262 K/UL (ref 130–400)
POTASSIUM SERPL-SCNC: 3.9 MEQ/L (ref 3.4–4.9)
PRO-BNP: 192 PG/ML
PROTEIN UA: ABNORMAL MG/DL
PROTHROMBIN TIME: 13.2 SEC (ref 12.3–14.9)
RBC # BLD: 4.71 M/UL (ref 4.2–5.4)
RBC UA: ABNORMAL /HPF (ref 0–2)
SARS-COV-2, NAAT: NOT DETECTED
SODIUM BLD-SCNC: 141 MEQ/L (ref 135–144)
SPECIFIC GRAVITY UA: 1.03 (ref 1–1.03)
TOTAL CK: 97 U/L (ref 0–170)
TOTAL PROTEIN: 6.9 G/DL (ref 6.3–8)
TROPONIN: <0.01 NG/ML (ref 0–0.01)
TSH REFLEX: 0.79 UIU/ML (ref 0.44–3.86)
URINE REFLEX TO CULTURE: YES
UROBILINOGEN, URINE: 0.2 E.U./DL
WBC # BLD: 12.6 K/UL (ref 4.8–10.8)
WBC UA: ABNORMAL /HPF (ref 0–5)
YEAST: PRESENT /HPF

## 2022-03-26 PROCEDURE — 84443 ASSAY THYROID STIM HORMONE: CPT

## 2022-03-26 PROCEDURE — 85025 COMPLETE CBC W/AUTO DIFF WBC: CPT

## 2022-03-26 PROCEDURE — 2580000003 HC RX 258: Performed by: STUDENT IN AN ORGANIZED HEALTH CARE EDUCATION/TRAINING PROGRAM

## 2022-03-26 PROCEDURE — 85610 PROTHROMBIN TIME: CPT

## 2022-03-26 PROCEDURE — 72125 CT NECK SPINE W/O DYE: CPT

## 2022-03-26 PROCEDURE — 80053 COMPREHEN METABOLIC PANEL: CPT

## 2022-03-26 PROCEDURE — 99285 EMERGENCY DEPT VISIT HI MDM: CPT | Performed by: PHYSICIAN ASSISTANT

## 2022-03-26 PROCEDURE — 82550 ASSAY OF CK (CPK): CPT

## 2022-03-26 PROCEDURE — 6830039001 HC L3 TRAUMA PRIORITY

## 2022-03-26 PROCEDURE — 70486 CT MAXILLOFACIAL W/O DYE: CPT

## 2022-03-26 PROCEDURE — 86850 RBC ANTIBODY SCREEN: CPT

## 2022-03-26 PROCEDURE — 1210000000 HC MED SURG R&B

## 2022-03-26 PROCEDURE — 87635 SARS-COV-2 COVID-19 AMP PRB: CPT

## 2022-03-26 PROCEDURE — 71045 X-RAY EXAM CHEST 1 VIEW: CPT

## 2022-03-26 PROCEDURE — 83880 ASSAY OF NATRIURETIC PEPTIDE: CPT

## 2022-03-26 PROCEDURE — 87502 INFLUENZA DNA AMP PROBE: CPT

## 2022-03-26 PROCEDURE — 74177 CT ABD & PELVIS W/CONTRAST: CPT

## 2022-03-26 PROCEDURE — 70450 CT HEAD/BRAIN W/O DYE: CPT

## 2022-03-26 PROCEDURE — 72128 CT CHEST SPINE W/O DYE: CPT

## 2022-03-26 PROCEDURE — 6360000002 HC RX W HCPCS: Performed by: STUDENT IN AN ORGANIZED HEALTH CARE EDUCATION/TRAINING PROGRAM

## 2022-03-26 PROCEDURE — 71260 CT THORAX DX C+: CPT

## 2022-03-26 PROCEDURE — 85730 THROMBOPLASTIN TIME PARTIAL: CPT

## 2022-03-26 PROCEDURE — 36415 COLL VENOUS BLD VENIPUNCTURE: CPT

## 2022-03-26 PROCEDURE — 0HQ1XZZ REPAIR FACE SKIN, EXTERNAL APPROACH: ICD-10-PCS | Performed by: STUDENT IN AN ORGANIZED HEALTH CARE EDUCATION/TRAINING PROGRAM

## 2022-03-26 PROCEDURE — 6360000004 HC RX CONTRAST MEDICATION: Performed by: STUDENT IN AN ORGANIZED HEALTH CARE EDUCATION/TRAINING PROGRAM

## 2022-03-26 PROCEDURE — 93005 ELECTROCARDIOGRAM TRACING: CPT | Performed by: STUDENT IN AN ORGANIZED HEALTH CARE EDUCATION/TRAINING PROGRAM

## 2022-03-26 PROCEDURE — 86900 BLOOD TYPING SEROLOGIC ABO: CPT

## 2022-03-26 PROCEDURE — 87077 CULTURE AEROBIC IDENTIFY: CPT

## 2022-03-26 PROCEDURE — 81001 URINALYSIS AUTO W/SCOPE: CPT

## 2022-03-26 PROCEDURE — 86901 BLOOD TYPING SEROLOGIC RH(D): CPT

## 2022-03-26 PROCEDURE — 83605 ASSAY OF LACTIC ACID: CPT

## 2022-03-26 PROCEDURE — 83735 ASSAY OF MAGNESIUM: CPT

## 2022-03-26 PROCEDURE — 87086 URINE CULTURE/COLONY COUNT: CPT

## 2022-03-26 PROCEDURE — 99285 EMERGENCY DEPT VISIT HI MDM: CPT

## 2022-03-26 PROCEDURE — 70496 CT ANGIOGRAPHY HEAD: CPT

## 2022-03-26 PROCEDURE — 72131 CT LUMBAR SPINE W/O DYE: CPT

## 2022-03-26 PROCEDURE — 70498 CT ANGIOGRAPHY NECK: CPT

## 2022-03-26 PROCEDURE — 84484 ASSAY OF TROPONIN QUANT: CPT

## 2022-03-26 PROCEDURE — 12014 RPR F/E/E/N/L/M 5.1-7.5 CM: CPT | Performed by: PHYSICIAN ASSISTANT

## 2022-03-26 RX ORDER — 0.9 % SODIUM CHLORIDE 0.9 %
500 INTRAVENOUS SOLUTION INTRAVENOUS ONCE
Status: COMPLETED | OUTPATIENT
Start: 2022-03-26 | End: 2022-03-26

## 2022-03-26 RX ORDER — SODIUM CHLORIDE 0.9 % (FLUSH) 0.9 %
10 SYRINGE (ML) INJECTION EVERY 12 HOURS SCHEDULED
Status: CANCELLED | OUTPATIENT
Start: 2022-03-26

## 2022-03-26 RX ORDER — SODIUM CHLORIDE 9 MG/ML
25 INJECTION, SOLUTION INTRAVENOUS PRN
Status: CANCELLED | OUTPATIENT
Start: 2022-03-26

## 2022-03-26 RX ORDER — SENNA AND DOCUSATE SODIUM 50; 8.6 MG/1; MG/1
1 TABLET, FILM COATED ORAL 2 TIMES DAILY
Status: CANCELLED | OUTPATIENT
Start: 2022-03-26

## 2022-03-26 RX ORDER — ONDANSETRON 4 MG/1
4 TABLET, ORALLY DISINTEGRATING ORAL EVERY 8 HOURS PRN
Status: CANCELLED | OUTPATIENT
Start: 2022-03-26

## 2022-03-26 RX ORDER — BISACODYL 10 MG
10 SUPPOSITORY, RECTAL RECTAL DAILY PRN
Status: CANCELLED | OUTPATIENT
Start: 2022-03-26

## 2022-03-26 RX ORDER — MAGNESIUM SULFATE 1 G/100ML
1000 INJECTION INTRAVENOUS PRN
Status: CANCELLED | OUTPATIENT
Start: 2022-03-26

## 2022-03-26 RX ORDER — SODIUM CHLORIDE 0.9 % (FLUSH) 0.9 %
10 SYRINGE (ML) INJECTION PRN
Status: CANCELLED | OUTPATIENT
Start: 2022-03-26

## 2022-03-26 RX ORDER — SODIUM CHLORIDE 9 MG/ML
INJECTION, SOLUTION INTRAVENOUS CONTINUOUS
Status: CANCELLED | OUTPATIENT
Start: 2022-03-26

## 2022-03-26 RX ORDER — LIDOCAINE HYDROCHLORIDE AND EPINEPHRINE 10; 10 MG/ML; UG/ML
20 INJECTION, SOLUTION INFILTRATION; PERINEURAL ONCE
Status: DISCONTINUED | OUTPATIENT
Start: 2022-03-26 | End: 2022-03-26 | Stop reason: HOSPADM

## 2022-03-26 RX ORDER — MAGNESIUM SULFATE IN WATER 40 MG/ML
2000 INJECTION, SOLUTION INTRAVENOUS ONCE
Status: COMPLETED | OUTPATIENT
Start: 2022-03-26 | End: 2022-03-26

## 2022-03-26 RX ORDER — ONDANSETRON 2 MG/ML
4 INJECTION INTRAMUSCULAR; INTRAVENOUS EVERY 6 HOURS PRN
Status: CANCELLED | OUTPATIENT
Start: 2022-03-26

## 2022-03-26 RX ORDER — METOPROLOL TARTRATE 5 MG/5ML
5 INJECTION INTRAVENOUS EVERY 6 HOURS
Status: CANCELLED | OUTPATIENT
Start: 2022-03-26

## 2022-03-26 RX ADMIN — SODIUM CHLORIDE 500 ML: 9 INJECTION, SOLUTION INTRAVENOUS at 14:19

## 2022-03-26 RX ADMIN — IOPAMIDOL 100 ML: 612 INJECTION, SOLUTION INTRAVENOUS at 13:13

## 2022-03-26 RX ADMIN — MAGNESIUM SULFATE HEPTAHYDRATE 2000 MG: 40 INJECTION, SOLUTION INTRAVENOUS at 14:10

## 2022-03-26 RX ADMIN — LEVETIRACETAM 750 MG: 100 INJECTION, SOLUTION INTRAVENOUS at 14:01

## 2022-03-26 NOTE — LACTATION NOTE
PROCEDURE NOTE: LACERATION REPAIR  Patient Name: Bianka Taylor   Medical Record Number: [id number]  Date: 3/26/2022    LOCATION: Left eyebrow  SIZE: 6 cm. COMPLEXITY:  Simple        Informed consent, after discussion of the risks, benefits, and alternatives to the procedure,  was not obtained verbally from the patient prior to procedure due to altered mental status. A timeout to verify the correct patient, procedure, and site was performed immediately prior to the procedure  The patient was identified using two patient identifiers: Yes. The H&P along with required diagnostics are available in Epic: Yes  The correct procedure was verified: Yes  Procedural site identified: Yes  Presence of required equipment verified prior to starting procedure: Yes  Patient allergies identified or reviewed: Yes  Site marking done: Not indicated    The laceration was cleaned with Betadine, irrigated with normal saline and scrubbed. The area was prepped and draped in the usual sterile fashion. Anesthesia was obtained by local infiltration of 8.0 cc of 1% Lidocaine without epinephrine. The wound was explored and no foreign body was noted. Hemostasis was achieved. Laceration was linear shaped, and involved the cutaneous tissue. The site was then repaired using 5-0 chronic gut using 8 interrupted sutures. There were no additional lacerations requiring repair. The wound was left open to air. The patient tolerated the procedure well. The sutures are absorbable and do not need to be removed.     Micah Ayala  Trauma/Critical Care/General Surgery

## 2022-03-26 NOTE — ED NOTES
Trauma PA at bedside placing sutures to laceration noted above left eyebrow     Allayne Opitz, RN  03/26/22 2025

## 2022-03-26 NOTE — H&P
supraorbital ridge to 6cm laceration and ecchymosis    Eyes: PERRL, Corneas/Conjunctiva without lesions and EOM intact   Ears: Not Examined   Nose: Septum Midline, No crepitus with motion; and No bloody discharge; Throat: Oral cavity without trauma   Neck: No midline tenderness and No lacerations/wounds  Pulmonary: External exam: no crepitus or pain with palpation, no contusions or abrasions;  Cardiovascular:    Pulses: Bilateral radial, femoral, DP and PT pulses are normal;  Abdomen: Appearance: Non-distended, No scars, lacerations, contusions; and Palpation: no tenderness   Rectal: Not performed  Pelvis/Perineum: Pelvis is stable to palpation;  Musculoskeletal:    Back/Spine: Thoracolumbar spinal column non-tender; no step off or deformity; Extremities: No gross upper or lower extremity signs of trauma;    PAST MEDICAL HISTORY:  Past Medical History:   Diagnosis Date    Hypertension     MI, acute, non ST segment elevation ([facility]     MS (multiple sclerosis) ([facility]        PAST SURGICAL HISTORY:  History reviewed. No pertinent surgical history. PRE-ADMISSION MEDICATIONS:   Prior to Admission medications    Medication Sig Start Date End Date Taking?  Authorizing Provider   memantine (NAMENDA) 5 MG tablet TAKE ONE TABLET BY MOUTH TWICE A DAY 12/1/21   Mariano Steinberg MD   baclofen (LIORESAL) 10 MG tablet TAKE ONE TABLET BY MOUTH FOUR TIMES A DAY 9/17/21   Mariano Steinberg MD   donepezil (ARICEPT) 5 MG tablet TAKE ONE TABLET BY MOUTH EVERY EVENING 9/17/21   Mariano Steinberg MD   Biotin 5 MG CAPS TAKE ONE CAPSULE BY MOUTH TWICE A DAY 9/17/21   Mariano Steinberg MD   trihexyphenidyl (ARTANE) 2 MG tablet TAKE ONE TABLET BY MOUTH TWICE A DAY 8/19/21   Mariano Steinberg MD   COPAXONE 20 MG/ML injection INJECT 1ML SUBCUTNAOEUSLY ONCE DAILY 7/19/21   Mariano Steinberg MD   dalfampridine (AMPYRA) 10 MG extended release tablet TAKE 1 TABLET BY MOUTH EVERY 12 HOURS. 5/3/21 6/2/21  Mariano Steinberg MD   furosemide (LASIX) 20 MG tablet TAKE ONE-HALF TABLET BY MOUTH EVERY DAY FOR 10 DAYS 2/8/21   Sapna Brown MD   Aspirin Buf,CaCarb-MgCarb-MgO, 81 MG TABS Take by mouth    Historical Provider, MD   fluticasone (FLONASE) 50 MCG/ACT nasal spray by Nasal route    Historical Provider, MD   magnesium oxide (MAG-OX) 400 MG tablet Take by mouth    Historical Provider, MD   magnesium oxide (MAGOX 400) 400 (241.3 Mg) MG TABS tablet Take 1 tablet by mouth daily 1/13/21   Sapna Brown MD   Compression Stockings MISC by Does not apply route Edema, medium 1/13/21   Sapna Brown MD   Mouthwashes Елена Bee) LIQD oral solution Swish and spit 15 mLs as needed (dry mouth) 2/23/20 1/13/21  Sapna Brown MD   alendronate (FOSAMAX) 70 MG tablet Take 70 mg by mouth 5/21/14   Historical Provider, MD   aspirin (ECOTRIN LOW STRENGTH) 81 MG EC tablet Take 81 mg by mouth 5/21/14   Historical Provider, MD   atorvastatin (LIPITOR) 80 MG tablet Take 80 mg by mouth 5/21/14   Historical Provider, MD   ciprofloxacin (CILOXAN) 0.3 % ophthalmic solution 1 drop 4/23/15   Historical Provider, MD   metoprolol succinate (TOPROL XL) 50 MG extended release tablet Take 50 mg by mouth 5/26/15   Historical Provider, MD   nitroGLYCERIN (NITROSTAT) 0.3 MG SL tablet Place 0.3 mg under the tongue    Historical Provider, MD   tolterodine (DETROL LA) 4 MG extended release capsule Take 4 mg by mouth 5/21/14   Historical Provider, MD   valsartan (DIOVAN) 80 MG tablet Take 80 mg by mouth 5/21/14   Historical Provider, MD   vitamin C (ASCORBIC ACID) 500 MG tablet Take 500 mg by mouth 5/21/14   Historical Provider, MD   Multiple Vitamins-Minerals (MULTIVITAMIN ADULT PO) Take 1 tablet by mouth 5/21/14   Historical Provider, MD   OMEGA-3 FATTY ACIDS PO Take 1,000 mg by mouth 5/21/14   Historical Provider, MD   escitalopram (LEXAPRO) 10 MG tablet Take 1 tablet by mouth daily 2/17/20 1/13/21  Sapna Brown MD       ALLERGIES:  Patient has no known allergies.     SOCIAL HISTORY:   Social History Socioeconomic History    Marital status: Single     Spouse name: None    Number of children: None    Years of education: None    Highest education level: None   Occupational History    None   Tobacco Use    Smoking status: Former Smoker    Smokeless tobacco: Never Used   Substance and Sexual Activity    Alcohol use: Never    Drug use: Never    Sexual activity: Not Currently   Other Topics Concern    None   Social History Narrative    None     Social Determinants of Health     Financial Resource Strain:     Difficulty of Paying Living Expenses: Not on file   Food Insecurity:     Worried About Running Out of Food in the Last Year: Not on file    Franki of Food in the Last Year: Not on file   Transportation Needs:     Lack of Transportation (Medical): Not on file    Lack of Transportation (Non-Medical): Not on file   Physical Activity:     Days of Exercise per Week: Not on file    Minutes of Exercise per Session: Not on file   Stress:     Feeling of Stress : Not on file   Social Connections:     Frequency of Communication with Friends and Family: Not on file    Frequency of Social Gatherings with Friends and Family: Not on file    Attends [employer] Services: Not on file    Active Member of [address] [place] [address] or Organizations: Not on file    Attends Club or Organization Meetings: Not on file    Marital Status: Not on file   Intimate Partner Violence:     Fear of Current or Ex-Partner: Not on file    Emotionally Abused: Not on file    Physically Abused: Not on file    Sexually Abused: Not on file   Housing Stability:     Unable to Pay for Housing in the Last Year: Not on file    Number of [place] in the Last Year: Not on file    Unstable Housing in the Last Year: Not on file       FAMILY HISTORY:  History reviewed. No pertinent family history.         REVIEW OF SYSTEMS: Unable to obtain secondary to mental status       BASIC LABS:   CBC with Differential:    Lab Results   Component Value Date WBC 12.6 03/26/2022    RBC 4.71 03/26/2022    RBC 4.55 05/08/2012    HGB 12.9 03/26/2022    HCT 40.3 03/26/2022     03/26/2022    MCV 85.7 03/26/2022    MCH 27.5 03/26/2022    MCHC 32.1 03/26/2022    RDW 15.3 03/26/2022    LYMPHOPCT 8.3 03/26/2022    MONOPCT 4.2 03/26/2022    BASOPCT 0.6 03/26/2022    MONOSABS 0.5 03/26/2022    LYMPHSABS 1.0 03/26/2022    EOSABS 0.0 03/26/2022    BASOSABS 0.1 03/26/2022     CMP:   Lab Results   Component Value Date     03/26/2022    K 3.9 03/26/2022     03/26/2022    CO2 22 03/26/2022    BUN 26 (H) 03/26/2022    CREATININE 0.85 03/26/2022    GLUCOSE 141 (H) 03/26/2022    CALCIUM 9.7 03/26/2022    PROT 6.9 03/26/2022    LABALBU 4.1 03/26/2022    BILITOT <0.2 03/26/2022    ALKPHOS 101 03/26/2022    AST 20 03/26/2022    ALT 15 03/26/2022    LABGLOM >60.0 03/26/2022    GFRAA >60.0 03/26/2022    GLOB 2.8 03/26/2022     Magnesium:   Lab Results   Component Value Date    MG 2.3 03/26/2022     Troponin:   Lab Results   Component Value Date    TROPONINI <0.010 03/26/2022     PT/INR:   Recent Labs     03/26/22  1245   PROTIME 13.2   INR 1.0     APTT:   Recent Labs     03/26/22  1245   APTT 23.9*     EtOH:   Lab Results   Component Value Date    ETOH <10 06/04/2021       RADIOLOGY: (Personally reviewed and per Radiology Report)  CXR: No radiographic evidence of acute intrathoracic process. CT Head: Left frontal, parietal, temporal, subdural hematoma with mass effect evidenced by localized effacement of sulci. Left frontal/supraorbital 1 x 2 cm scalp hematoma. Mild cerebral atrophy. Chronic ischemic white matter disease. CTA Head: Absent left A1 segment, anterior cerebral artery. Absent bilateral posterior communicating arteries. Extensive persistent fetal circulation. CT C-Spine: Extensive hyperostosis and pannus formation may reflect presence of rheumatoid disease, or remote trauma. No acute fracture identified.  Marked multilevel degenerative changes discussed. CT RECONS: No fracture identified. No fracture. Moderate central stenosis, L4-5. CT Chest: Trace dependent subsegmental atelectatic change. Remote left fourth rib fracture. CT Abdomen/Pelvis: 4.2 x 3.6 x 3.6 cm area cystic change left upper quadrant with a small dependent rim of hemorrhage in its inferior margin. Finding appears separate from left kidney, left adrenal, spleen, and aorta. Finding abuts but is not definitely contiguous with gastric cardia. CT Face: Left frontal/parietal/temporal subdural hematoma. Left frontal scalp hematoma. Extensive hyperostosis and pannus formation C1-C2. Retention cysts, bilateral sphenoid sinuses. CTA Neck: 45% stenosis, right internal carotid artery. 55% stenosis, left internal carotid artery. ASSESSMENT and PLAN:  Kj David is a 73 y.o. female with a PMHx of MS, HTN, HLD and MI presented to [facility] [place] [facility]  after being found down in the prone position on the floor of her kitchen by her home health aid. (+) head strike, (+) 81mg ASA daily. Unknown down time. Last known well 3/25/22 at 3pm. On exam, GCS 9, yelling out \"it hurts\", \"help me\", moves all extremities, no focal weakness. Hypertensive. Maintaining O2 sats on room air. Left eyebrow laceration repaired with absorbable suture (see separate procedure note). CT head demonstrated left frontal, parietal, temporal, subdural hematoma with mass effect. Remainder of trauma imaging without traumatic injuries. Remote left fourth rib fracture seen. UA with trace leukocyte esterase, trance ketones, trace protein. Troponin and EKG unremarkable for cardiac  ischemia. Mild leukocytosis, like reactive from recent fall. BUN/Cr 26/0. 85. Lactic Acid 2.7. CK 97.     Trauma work up found left frontal, parietal, temporal, subdural hematoma with mass effect and laceration of left eyebrow (repaired with chromic gut)    - ED spoke with Dr. Wesly Tanner who recommended transfer to [facility] Dr. Amparo Lopez will be the accepting Trauma Attending at [place] [address]  - Dr. Umair Nettles at bedside in CT at 12:42pm  - Pt started on [address] for seizure PPx and Cardene gtt by PABLO Sheikh PA-C  Trauma/Critical Care/General Surgery  [phone] ([phone]) [phone]     This patient's plan of care was discussed and made in collaboration with Trauma Attending physician, Tricia Hill MD.

## 2022-03-26 NOTE — PROGRESS NOTES
Spiritual Care Services     Summary of Visit:  Emotional support provided to pt's long term home health aide Radha Michaud, who found her on the floor at home. Pt has a brother and sister-in-law Radha Michaud said, unaware of other family. Pt awake but not oriented at this time. Spiritual Assessment/Intervention/Outcomes:    Encounter Summary  Services provided to[de-identified] Family (home health aide Radha Michaud)  Referral/Consult From[de-identified] Multi-disciplinary team  Support System: Family members  Place of Restorationist: none  Continue Visiting: Yes  Complexity of Encounter: Low  Length of Encounter: 15 minutes  Spiritual Assessment Completed: Yes     Crisis  Type: Trauma  Assessment: Approachable  Intervention: Active listening,Explored feelings, thoughts, concerns  Outcome: Expressed gratitude                            Care Plan:    Available for ongoing support. Spiritual Care Services   Electronically signed by Bruce Costa on 3/26/22 at 1:00 PM EDT     To reach a  for emotional and spiritual support, place an [facility] consult request.   If a  is needed immediately, dial 0 and ask to page the on-call .

## 2022-03-26 NOTE — ED PROVIDER NOTES
[facility] ED  eMERGENCY dEPARTMENT eNCOUnter      Pt Name: Piper Cazares  MRN: [id number]  [place] 1950  Date of evaluation: 3/26/2022  Provider: Bo Platt MD      HISTORY OF PRESENT ILLNESS      No chief complaint on file. The history is provided by the EMS and Home Health Aid. Piper Cazares is a 73 y.o. female with a PMH clinically significant for HTN, HLD, Ataxic Gait, CAD s/p MI, MS, Osteoporosis and Cognitive dysfunction presenting to the ED via EMS c/o altered mental status after being found down on her kitchen floor just prior to arrival by her home health aide. Home health aide stating that she was last seen well at 3 PM yesterday. Acting appropriately at her baseline which is alert and oriented x2 and able to have normal discussion. States that she found her on her kitchen floor several feet from her walker with blood surrounding the left forehead. Was not answering questions appropriately. Does not believe that the patient is on any blood thinners. Is unsure regarding the patient's CODE STATUS. States that the patient is debilitated secondary to her MS. Does live alone. Per Chart Review: No recent evaluations noted in the ED. Did have positive fall in 06/2021 in the setting of UTI. REVIEW OF SYSTEMS       Review of Systems   Unable to perform ROS: Mental status change       PAST MEDICAL HISTORY     Past Medical History:   Diagnosis Date    Hypertension     MI, acute, non ST segment elevation ([address]     MS (multiple sclerosis) ([facility] [address]        SURGICAL HISTORY     History reviewed. No pertinent surgical history. FAMILY HISTORY     History reviewed. No pertinent family history.     SOCIAL HISTORY       Social History     Socioeconomic History    Marital status: Single     Spouse name: None    Number of children: None    Years of education: None    Highest education level: None   Occupational History    None   Tobacco Use    Smoking status: Former Smoker    Smokeless tobacco: Never Used   Substance and Sexual Activity    Alcohol use: Never    Drug use: Never    Sexual activity: Not Currently   Other Topics Concern    None   Social History Narrative    None     Social Determinants of Health     Financial Resource Strain:     Difficulty of Paying Living Expenses: Not on file   Food Insecurity:     Worried About Running Out of Food in the Last Year: Not on file    Franki of Food in the Last Year: Not on file   Transportation Needs:     Lack of Transportation (Medical): Not on file    Lack of Transportation (Non-Medical):  Not on file   Physical Activity:     Days of Exercise per Week: Not on file    Minutes of Exercise per Session: Not on file   Stress:     Feeling of Stress : Not on file   Social Connections:     Frequency of Communication with Friends and Family: Not on file    Frequency of Social Gatherings with Friends and Family: Not on file    Attends [employer] Services: Not on file    Active Member of [address] [place] [address] or Organizations: Not on file    Attends Club or Organization Meetings: Not on file    Marital Status: Not on file   Intimate Partner Violence:     Fear of Current or Ex-Partner: Not on file    Emotionally Abused: Not on file    Physically Abused: Not on file    Sexually Abused: Not on file   Housing Stability:     Unable to Pay for Housing in the Last Year: Not on file    Number of [place] in the Last Year: Not on file    Unstable Housing in the Last Year: Not on file       CURRENT MEDICATIONS       Discharge Medication List as of 3/26/2022  5:13 PM      CONTINUE these medications which have NOT CHANGED    Details   memantine (NAMENDA) 5 MG tablet TAKE ONE TABLET BY MOUTH TWICE A DAY, Disp-60 tablet, R-0Normal      baclofen (LIORESAL) 10 MG tablet TAKE ONE TABLET BY MOUTH FOUR TIMES A DAY, Disp-120 tablet, R-0Normal      donepezil (ARICEPT) 5 MG tablet TAKE ONE TABLET BY MOUTH EVERY EVENING, Disp-30 tablet, R-3Normal Biotin 5 MG CAPS TAKE ONE CAPSULE BY MOUTH TWICE A DAY, Disp-60 capsule, R-3Normal      trihexyphenidyl (ARTANE) 2 MG tablet TAKE ONE TABLET BY MOUTH TWICE A DAY, Disp-60 tablet, R-1Normal      COPAXONE 20 MG/ML injection INJECT 1ML SUBCUTNAOEUSLY ONCE DAILY, Disp-1 kit, R-3, DAWNormal      dalfampridine (AMPYRA) 10 MG extended release tablet TAKE 1 TABLET BY MOUTH EVERY 12 HOURS., Disp-60 tablet, R-11Normal      furosemide (LASIX) 20 MG tablet TAKE ONE-HALF TABLET BY MOUTH EVERY DAY FOR 10 DAYS, Disp-5 tablet, R-0Normal      Aspirin Buf,CaCarb-MgCarb-MgO, 81 MG TABS Take by mouthHistorical Med      fluticasone (FLONASE) 50 MCG/ACT nasal spray by Nasal routeHistorical Med      magnesium oxide (MAG-OX) 400 MG tablet Take by mouthHistorical Med      magnesium oxide (MAGOX 400) 400 (241.3 Mg) MG TABS tablet Take 1 tablet by mouth daily, Disp-30 tablet, R-1Normal      Compression Stockings MISC Starting Wed 1/13/2021, Disp-1 each, R-0, PrintEdema, medium      Mouthwashes (BIOTENE) LIQD oral solution Swish and spit 15 mLs as needed (dry mouth), Disp-59 mL, R-3Normal      alendronate (FOSAMAX) 70 MG tablet Take 70 mg by mouthHistorical Med      aspirin (ECOTRIN LOW STRENGTH) 81 MG EC tablet Take 81 mg by mouthHistorical Med      atorvastatin (LIPITOR) 80 MG tablet Take 80 mg by mouthHistorical Med      ciprofloxacin (CILOXAN) 0.3 % ophthalmic solution 1 dropHistorical Med      metoprolol succinate (TOPROL XL) 50 MG extended release tablet Take 50 mg by mouthHistorical Med      nitroGLYCERIN (NITROSTAT) 0.3 MG SL tablet Place 0.3 mg under the tongueHistorical Med      tolterodine (DETROL LA) 4 MG extended release capsule Take 4 mg by mouthHistorical Med      valsartan (DIOVAN) 80 MG tablet Take 80 mg by mouthHistorical Med      vitamin C (ASCORBIC ACID) 500 MG tablet Take 500 mg by mouthHistorical Med      Multiple Vitamins-Minerals (MULTIVITAMIN ADULT PO) Take 1 tablet by mouthHistorical Med      OMEGA-3 FATTY ACIDS PO Take 1,000 mg by mouthHistorical Med      escitalopram (LEXAPRO) 10 MG tablet Take 1 tablet by mouth daily, Disp-30 tablet, R-3Normal             ALLERGIES     Patient has no known allergies. PHYSICAL EXAM       ED Triage Vitals [03/26/22 1224]   BP Temp Temp Source Pulse Resp SpO2 Height Weight   (!) 153/120 97.5 °F (36.4 °C) Oral 103 20 92 % 5' 3\" (1.6 m) 180 lb (81.6 kg)       Physical Exam  Vitals and nursing note reviewed. Exam conducted with a chaperone present. Constitutional:       General: She is awake. She is in acute distress. Appearance: She is ill-appearing. Interventions: Cervical collar in place. Comments: AMS   HENT:      Head: Normocephalic. Contusion and laceration present. Nose:      Right Nostril: No septal hematoma or occlusion. Left Nostril: No septal hematoma or occlusion. Mouth/Throat:      Mouth: Mucous membranes are dry. Pharynx: Oropharynx is clear. Eyes:      Extraocular Movements: Extraocular movements intact. Pupils: Pupils are equal, round, and reactive to light. Neck:      Trachea: Trachea normal.      Comments: No midline TTP or stepoff. Cardiovascular:      Rate and Rhythm: Regular rhythm. Tachycardia present. Pulses: Normal pulses. Pulmonary:      Effort: Pulmonary effort is normal. No respiratory distress. Comments: Bilateral breath sounds. Chest:      Chest wall: No deformity, tenderness or crepitus. Abdominal:      General: There are no signs of injury. Palpations: Abdomen is soft. Comments: Mild contracture of abd musculature   Musculoskeletal:         General: No deformity. Cervical back: No spinous process tenderness. Right lower leg: No edema. Left lower leg: No edema. Skin:     General: Skin is warm and dry. Capillary Refill: Capillary refill takes less than 2 seconds. Neurological:      Mental Status: She is alert. She is disoriented and confused.       GCS: GCS eye subscore is 4. GCS verbal subscore is 3. GCS motor subscore is 5. Sensory: Sensation is intact. Motor: Motor function is intact. Comments: Strength and sensation grossly intact in BUE/BLE.    Psychiatric:      Comments: Unable to assess, AMS         MDM:   Chart Reviewed: PMH and additional information as noted in HPI obtained from chart review    Vitals:    Vitals:    03/26/22 1345 03/26/22 1355 03/26/22 1400 03/26/22 1415   BP: (!) 157/141  (!) 206/129 (!) 116/95   Pulse: 103  96 97   Resp: 20  15 19   Temp:       TempSrc:       SpO2: 99% 98%  98%   Weight:       Height:           PROCEDURES:  Unless otherwise noted below, none  Procedures    LABS:  Labs Reviewed   LACTIC ACID - Abnormal; Notable for the following components:       Result Value    Lactic Acid 2.7 (*)     All other components within normal limits   COMPREHENSIVE METABOLIC PANEL - Abnormal; Notable for the following components:    Glucose 141 (*)     BUN 26 (*)     All other components within normal limits   CBC WITH AUTO DIFFERENTIAL - Abnormal; Notable for the following components:    WBC 12.6 (*)     MCHC 32.1 (*)     RDW 15.3 (*)     Neutrophils Absolute 10.9 (*)     All other components within normal limits   URINALYSIS WITH REFLEX TO CULTURE - Abnormal; Notable for the following components:    Clarity, UA CLOUDY (*)     Ketones, Urine TRACE (*)     Protein, UA TRACE (*)     Leukocyte Esterase, Urine TRACE (*)     All other components within normal limits   APTT - Abnormal; Notable for the following components:    aPTT 23.9 (*)     All other components within normal limits   MICROSCOPIC URINALYSIS - Abnormal; Notable for the following components:    Bacteria, UA MANY (*)     Yeast, UA Present (*)     WBC, UA 20-50 (*)     All other components within normal limits   POCT GLUCOSE - Normal   RAPID INFLUENZA A/B ANTIGENS   COVID-19, RAPID   CULTURE, URINE   MAGNESIUM   TROPONIN   BRAIN NATRIURETIC PEPTIDE   CK   TSH WITH REFLEX cysts. There Adrenals:  Normal.       Small bowel:  Normal in caliber. Appendix:  Not visualized. Colon:  Normal in caliber. Peritoneum:  No free air. 4.2 x 3.6 x 3.6 cm area of cystic change, with high attenuation rim in its dependent margin, left upper lobe (series 2, image 55, series 601, image 35). Finding is separate from spleen located laterally, kidney located    inferiorly, and adrenal located posteriorly. Finding separate from abdominal aorta. Finding abuts greater curve stomach. Vessels: Aorta normal in course and caliber. Diffuse aortoiliac atherosclerotic change. Portal vein, splenic vein, superior mesenteric vein are patent. Lymph nodes:        Retroperitoneal:  No enlarged retroperitoneal lymph nodes. Mesenteric:  No enlarged mesenteric lymph nodes. Pelvic: No enlarged pelvic lymph nodes. Ureters: Normal in course and caliber. No calcifications. Bladder: No wall thickening. Reproductive organs: 2.6 mm calcification, uterine body/fundus junction      Abdominal Wall:  No hernia identified. No diastasis of rectus musculature. No edema or masses. Bones:  No bone lesions. No degenerative changes. No post operative changes. IMPRESSION:      4.2 x 3.6 x 3.6 cm area cystic change left upper quadrant with a small dependent rim of hemorrhage in its inferior margin. Finding appears separate from left kidney, left adrenal, spleen, and aorta. Finding abuts but is not definitely contiguous with    gastric cardia. Uterine fibroid. All CT scans at this facility use dose modulation, iterative reconstruction, and/or weight based dosing when appropriate to reduce radiation dose to as low as reasonably achievable. CT ABDOMEN PELVIS W IV CONTRAST Additional Contrast? None   Final Result      Trace dependent subsegmental atelectatic change. Remote left fourth rib fracture.             CT OF THE ABDOMEN AND PELVIS WITH Finding abuts but is not definitely contiguous with    gastric cardia. Uterine fibroid. All CT scans at this facility use dose modulation, iterative reconstruction, and/or weight based dosing when appropriate to reduce radiation dose to as low as reasonably achievable. CT THORACIC SPINE WO CONTRAST   Final Result      No fracture identified. All CT scans at this facility use dose modulation, iterative reconstruction, and/or weight based dosing when appropriate to reduce radiation dose to as low as reasonably achievable. CT LUMBAR SPINE WO CONTRAST   Final Result      No fracture. Moderate central stenosis, L4-5. All CT scans at this facility use dose modulation, iterative reconstruction, and/or weight based dosing when appropriate to reduce radiation dose to as low as reasonably achievable. CT FACIAL BONES WO CONTRAST   Final Result      Left frontal/parietal/temporal subdural hematoma. Left frontal scalp hematoma. Extensive hyperostosis and pannus formation C1-C2. Retention cysts, bilateral sphenoid sinuses. All CT scans at this facility use dose modulation, iterative reconstruction, and/or weight based dosing when appropriate to reduce radiation dose to as low as reasonably achievable. CTA HEAD W WO CONTRAST   Final Result      Absent left A1 segment, anterior cerebral artery. Absent bilateral posterior communicating arteries. Extensive persistent fetal circulation. All CT scans at this facility use dose modulation, iterative reconstruction, and/or weight based dosing when appropriate to reduce radiation dose to as low as reasonably achievable.       XR CHEST PORTABLE   Final Result          ED Course as of 03/26/22 2131   Sat Mar 26, 2022   1238 1236 upgraded to CAT 1 trauma d/t altered mentation with facial trauma/ head injury, unknown down time - last known well at 1500 yesterday per home health aid bedside [CS]   1354 XR CHEST PORTABLE  No evidence of gross acute cardiopulmonary abnormalities. [NA]   1403 EKG 12 Lead  EKG showing normal sinus rhythm, rate of 99 bpm.  Normal axis, prolonged QTC. Nonspecific ST-T wave abnormalities. [NA]      ED Course User Index  [CS] Juanjose Art, APRN - CNP  [NA] Bo Platt MD       73 y.o. female with a PMH clinically significant for HTN, HLD, Ataxic Gait, CAD s/p MI, MS, Osteoporosis and Cognitive dysfunction presenting to the ED via EMS c/o altered mental status after being found down on her kitchen floor just prior to arrival by her home health aide. Upon initial evaluation, Pt altered with GCS of 12, tachycardic and significantly hypertensive. PE as noted above. Labs, , EKG, and Imaging as noted above. Given findings, clinical presentation most likely consistent w/ traumatic subdural hemorrhage with altered mental status in addition to simple forehead laceration repaired by trauma surgery status post likely fall from standing. Unclear regarding initial cause of fall. Patient significantly altered with unknown downtime prior to evaluation in the emergency department. Given altered mental status, was upgraded to a category 1 trauma. Initially hypertensive in addition to tachycardia. Not anticoagulated, no reversal required. Dr. Ricky Fajardo, trauma surgery, also evaluating patient in the ED. Subdural hemorrhage noted and immediately neurosurgery was contacted. Dr. Maliha Thomason with concern for possible worsening edema given the altered mental status. Uncomfortable accepting the patient at this time and recommending transfer to tertiary care facility. Patient was therefore arranged for transfer [facility]. Accepted by Dr. Oneyda Moore. Hypertension improved prior to discharge. Nicardipine drip was ordered in case of worsening hypertension however. Goal SBP of 1 60-1 80 which was discussed with neurosurgery.   Also initiated on seizure prophylaxis in the ED. Although altered, currently protecting airway in the emergency department. No clear evidence of herniation at this time. Did not preemptively intubate. Patient care was transferred to [facility] without further acute events. Pt was administered   Medications   iopamidol (ISOVUE-300) 61 % injection 100 mL (100 mLs IntraVENous Given 3/26/22 1313)   levETIRAcetam (KEPPRA) 750 mg in sodium chloride 0.9 % 100 mL IVPB (0 mg IntraVENous Stopped 3/26/22 1416)   magnesium sulfate 2000 mg in 50 mL IVPB premix (0 mg IntraVENous Patient Transferred to Other Facility 3/26/22 1448)   0.9 % sodium chloride bolus (0 mLs IntraVENous Patient Transferred to Other Facility 3/26/22 1448)       Plan: Transfer to [facility] for further evaluation and management. Accepted by Dr. Irving Rodriguez. CRITICAL CARE TIME   Total CriticalCare time was 30 minutes, excluding separately reportable procedures. There was a high probability of clinically significant/life threatening deterioration in the patient's condition which required my urgent intervention. FINAL IMPRESSION      1. SDH (subdural hematoma) (HCC)    2. Fall, initial encounter    3. Altered mental status, unspecified altered mental status type    4.  Facial laceration, initial encounter          DISPOSITION/PLAN   DISPOSITION  03/26/2022 05:11:52 PM      Discharge Medication List as of 3/26/2022  5:13 PM           MD Gorge Chang MD  03/26/22 7037

## 2022-03-26 NOTE — PROGRESS NOTES
72-year-old female who has underlying dementia and longstanding multiple sclerosis with a home health aide. Found on the floor away from her wheelchair brought in by EMS. She is disoriented with a head injury left frontal area. Altered mental status will track with her eyes she is awake but unable to verbalize or communicate well. CT scan head shows a very small acute subdural hematoma over the left convexity      Concerned about developing cerebral edema diffuse axonal injury from cerebral injury. Advised to be monitored at a tertiary care center.     Alix Espinoza MD

## 2022-03-26 NOTE — ED TRIAGE NOTES
PT PRESENTS FROM HOME BY EMS FOR COMPLAINT OF FALL  PATIENT FOUND BY HOME HEALTH AID AND EMS LAYING ON FLOOR FACE DOWN IN THE KITCHEN THIS MORNING.   PT DISORIENTED NOTED HEAD INJURY TO LEFT FRONTAL AREA

## 2022-03-28 LAB
EKG ATRIAL RATE: 99 BPM
EKG P AXIS: 62 DEGREES
EKG P-R INTERVAL: 178 MS
EKG Q-T INTERVAL: 388 MS
EKG QRS DURATION: 84 MS
EKG QTC CALCULATION (BAZETT): 497 MS
EKG R AXIS: -22 DEGREES
EKG T AXIS: 100 DEGREES
EKG VENTRICULAR RATE: 99 BPM

## 2022-03-28 PROCEDURE — 93010 ELECTROCARDIOGRAM REPORT: CPT | Performed by: INTERNAL MEDICINE

## 2022-03-30 LAB
ORGANISM: ABNORMAL
URINE CULTURE, ROUTINE: ABNORMAL
URINE CULTURE, ROUTINE: ABNORMAL

## 2022-04-11 ENCOUNTER — OFFICE VISIT (OUTPATIENT)
Dept: GERIATRIC MEDICINE | Age: 72
End: 2022-04-11
Payer: COMMERCIAL

## 2022-04-11 DIAGNOSIS — F41.9 ANXIETY DISORDER, UNSPECIFIED TYPE: Primary | ICD-10-CM

## 2022-04-11 DIAGNOSIS — F02.80 DEMENTIA ASSOCIATED WITH OTHER UNDERLYING DISEASE WITHOUT BEHAVIORAL DISTURBANCE (HCC): ICD-10-CM

## 2022-04-11 PROCEDURE — 99305 1ST NF CARE MODERATE MDM 35: CPT | Performed by: PSYCHIATRY & NEUROLOGY

## 2022-04-11 NOTE — PROGRESS NOTES
Arlette Correa     Psychiatry Initial evaluation    CHIEF COMPLAINT:  Psychiatry eval and medication management with review    History obtained from:  patient    Patient was seen after discussing with the treatment team and reviewing the chart      HISTORY OF PRESENT ILLNESS:      The patient is a 73 y.o. female with significant past history of dementia and anxiety    Pt was recently admitted to SNF following a fall at home with left frontal, parietal, temporal, subdural hematoma with mass effect and laceration of left eyebrow    Pt since then has been recovering from the trauma. She does not recall the circumstances of admit or fall. Pt is anxious thinking about the whole issue. Pt denies feeling depressed or suicidal.  Denies any hopeless or worthless feeling  Not having any active SI or HI  Feeling anxious for which she has been prescribed trazodone prn. Pt has been having confusion on and off  Memory issues around short term memory although her remote memory seem to be intact    Stressors: recent fall and head tauma, physical decline    The patient is not currently receiving care for the above psychiatric illness. MEDICATIONS: reviewed per [facility]  Psych medication- Trazodone 25 mg hs prn  Namenda 5 mg in the morning    Compliance: yes    Psychiatric Review of Systems       Depression: denies     Maryellen or Hypomania:  no     Panic Attacks:  yes      Phobias:  no     Obsessions and Compulsions:  no     PTSD : no     Hallucinations:  no     Delusions:  no    Substance Abuse History:  ETOH: no   Marijuana: no  Opiates: no  Other Drugs: no      Past Psychiatric History:  Denies any past illness other than anxiety disorder    Past Medical History:        Diagnosis Date    Hypertension     MI, acute, non ST segment elevation (HCC)     MS (multiple sclerosis) ([facility] [address]        Past Surgical History:    No past surgical history on file. Allergies:   Patient has no known allergies.     Family History  No family history on file. Social History:  Social History     Socioeconomic History    Marital status: Single     Spouse name: Not on file    Number of children: Not on file    Years of education: Not on file    Highest education level: Not on file   Occupational History    Not on file   Tobacco Use    Smoking status: Former Smoker    Smokeless tobacco: Never Used   Substance and Sexual Activity    Alcohol use: Never    Drug use: Never    Sexual activity: Not Currently   Other Topics Concern    Not on file   Social History Narrative    Not on file     Social Determinants of Health     Financial Resource Strain:     Difficulty of Paying Living Expenses: Not on file   Food Insecurity:     Worried About [address] in the Last Year: Not on file    [address] in the Last Year: Not on file   Transportation Needs:     Lack of Transportation (Medical): Not on file    Lack of Transportation (Non-Medical): Not on file   Physical Activity:     Days of Exercise per Week: Not on file    Minutes of Exercise per Session: Not on file   Stress:     Feeling of Stress : Not on file   Social Connections:     Frequency of Communication with Friends and Family: Not on file    Frequency of Social Gatherings with Friends and Family: Not on file    Attends [employer] Services: Not on file    Active Member of [address] [place] [address] [employer] or Organizations: Not on file    Attends Club or Organization Meetings: Not on file    Marital Status: Not on file   Intimate Partner Violence:     Fear of Current or Ex-Partner: Not on file    Emotionally Abused: Not on file    Physically Abused: Not on file    Sexually Abused: Not on file   Housing Stability:     Unable to Pay for Housing in the Last Year: Not on file    Number of [place] in the Last Year: Not on file    Unstable Housing in the Last Year: Not on file       EXAMINATION:    REVIEW OF SYSTEMS:    ROS:  [x] All negative/unchanged except if checked.  Explain positive(checked items) below:  [] Constitutional  [] Eyes  [] Ear/Nose/Mouth/Throat  [] Respiratory  [] CV  [] GI  []   [] Musculoskeletal  [] Skin/Breast  [x] Neurological- weakness  [] Endocrine  [] Heme/Lymph  [] Allergic/Immunologic    Explanation:     Vitals: There were no vitals taken for this visit. Neurologic Exam:   Muscle Strength & Tone: full ROM  Gait: in bed   Involuntary Movements: No    Mental Status Examination:    Level of consciousness:  within normal limits   Appearance:  ill-appearing  Behavior/Motor:  psychomotor retardation  Attitude toward examiner:  cooperative  Speech:  slow   Mood: anxious  Affect:  mood congruent  Thought processes:  slow   Thought content:  Suicidal Ideation:  denies suicidal ideation  Cognition:  oriented to person, place, and time   Concentration poor  Memory impaired recent memory  Insight fair   Judgement poor   Fund of Knowledge limited        DIAGNOSIS:     Anxiety disorder unspecified   Cognitive deficit- early dementia         LABS:   No visits with results within 2 Day(s) from this visit. Latest known visit with results is:   Admission on 03/26/2022, Discharged on 03/26/2022   Component Date Value Ref Range Status    Lactic Acid 03/26/2022 2.7* 0.5 - 2.2 mmol/L Final    Sodium 03/26/2022 141  135 - 144 mEq/L Final    Potassium 03/26/2022 3.9  3.4 - 4.9 mEq/L Final    Chloride 03/26/2022 105  95 - 107 mEq/L Final    CO2 03/26/2022 22  20 - 31 mEq/L Final    Anion Gap 03/26/2022 14  9 - 15 mEq/L Final    Glucose 03/26/2022 141* 70 - 99 mg/dL Final    BUN 03/26/2022 26* 8 - 23 mg/dL Final    CREATININE 03/26/2022 0.85  0.50 - 0.90 mg/dL Final    GFR Non- 03/26/2022 >60.0  >60 Final    Comment: >60 mL/min/1.73m2 EGFR, calc. for ages 25 and older using the  MDRD formula (not corrected for weight), is valid for stable  renal function.  GFR  03/26/2022 >60.0  >60 Final    Comment: >60 mL/min/1.73m2 EGFR, calc.  for ages 25 and older using the  MDRD formula (not corrected for weight), is valid for stable  renal function.       Calcium 03/26/2022 9.7  8.5 - 9.9 mg/dL Final    Total Protein 03/26/2022 6.9  6.3 - 8.0 g/dL Final    Albumin 03/26/2022 4.1  3.5 - 4.6 g/dL Final    Total Bilirubin 03/26/2022 <0.2  0.2 - 0.7 mg/dL Final    Alkaline Phosphatase 03/26/2022 101  40 - 130 U/L Final    ALT 03/26/2022 15  0 - 33 U/L Final    AST 03/26/2022 20  0 - 35 U/L Final    Globulin 03/26/2022 2.8  2.3 - 3.5 g/dL Final    Glucose 03/26/2022 131  mg/dL Final    Magnesium 03/26/2022 2.3  1.7 - 2.4 mg/dL Final    WBC 03/26/2022 12.6* 4.8 - 10.8 K/uL Final    RBC 03/26/2022 4.71  4.20 - 5.40 M/uL Final    Hemoglobin 03/26/2022 12.9  12.0 - 16.0 g/dL Final    Hematocrit 03/26/2022 40.3  37.0 - 47.0 % Final    MCV 03/26/2022 85.7  82.0 - 100.0 fL Final    MCH 03/26/2022 27.5  27.0 - 31.3 pg Final    MCHC 03/26/2022 32.1* 33.0 - 37.0 % Final    RDW 03/26/2022 15.3* 11.5 - 14.5 % Final    Platelets 39/64/8119 262  130 - 400 K/uL Final    Neutrophils % 03/26/2022 86.9  % Final    Lymphocytes % 03/26/2022 8.3  % Final    Monocytes % 03/26/2022 4.2  % Final    Eosinophils % 03/26/2022 0.0  % Final    Basophils % 03/26/2022 0.6  % Final    Neutrophils Absolute 03/26/2022 10.9* 1.4 - 6.5 K/uL Final    Lymphocytes Absolute 03/26/2022 1.0  1.0 - 4.8 K/uL Final    Monocytes Absolute 03/26/2022 0.5  0.2 - 0.8 K/uL Final    Eosinophils Absolute 03/26/2022 0.0  0.0 - 0.7 K/uL Final    Basophils Absolute 03/26/2022 0.1  0.0 - 0.2 K/uL Final    Troponin 03/26/2022 <0.010  0.000 - 0.010 ng/mL Final    Methodology by Troponin T.    Pro-BNP 03/26/2022 192  pg/mL Final    Comment: NT-pro BNP ACUTE Interpretive Guidelines:        Age        Cutoff for Heart Failure     Less than 50 yrs   450 pg/mL     50-75 yrs           900 pg/mL     Greater than 75 yrs  1800 pg/mL    NT-pro BNP NON-ACUTE Interpretive Guidelines:      Age Reference Range    Less than 74 yrs   0-125 pg/mL    Greater than 74 yrs   0-450 pg/mL    Other possible causes of an elevated NT-proBNP include:  cardiac ischemia, acute coronary syndrome, COPD, pneumonia,  atrial fibrillation, pulmonary emboli, pulmonary hypertension,  pericarditis    Reference:  Violet Valenzuela, et al. NT-proBNP testing for diagnosis and  short-term prognosis in acute destabilized HF: an international  pooled analysis of 1256 patients.  Heart Journal.  [id number]        Total CK 03/26/2022 97  0 - 170 U/L Final    TSH 03/26/2022 0.791  0.440 - 3.860 uIU/mL Final    Color, UA 03/26/2022 Yellow  Straw/Yellow Final    Clarity, UA 03/26/2022 CLOUDY* Clear Final    Glucose, Ur 03/26/2022 Negative  Negative mg/dL Final    Bilirubin Urine 03/26/2022 Negative  Negative Final    Ketones, Urine 03/26/2022 TRACE* Negative mg/dL Final    Specific Gravity, UA 03/26/2022 1.028  1.005 - 1.030 Final    Blood, Urine 03/26/2022 Negative  Negative Final    pH, UA 03/26/2022 5.0  5.0 - 9.0 Final    Protein, UA 03/26/2022 TRACE* Negative mg/dL Final    Urobilinogen, Urine 03/26/2022 0.2  <2.0 E.U./dL Final    Nitrite, Urine 03/26/2022 Negative  Negative Final    Leukocyte Esterase, Urine 03/26/2022 TRACE* Negative Final    Urine Reflex to Culture 03/26/2022 Yes   Final    Ventricular Rate 03/26/2022 99  BPM Final    Atrial Rate 03/26/2022 99  BPM Final    P-R Interval 03/26/2022 178  ms Final    QRS Duration 03/26/2022 84  ms Final    Q-T Interval 03/26/2022 388  ms Final    QTc Calculation (Bazett) 03/26/2022 497  ms Final    P Axis 03/26/2022 62  degrees Final    R Axis 03/26/2022 -22  degrees Final    T Axis 03/26/2022 100  degrees Final    Influenza A by PCR 03/26/2022 Negative   Final    Influenza B by PCR 03/26/2022 Negative   Final    aPTT 03/26/2022 23.9* 24.4 - 36.8 sec Final    Comment: Effective 11/4/2020:  Heparin Therapeutic Range: 64.0  98.0 seconds.       Protime 03/26/2022 13.2  12.3 - 14.9 sec Final    INR 03/26/2022 1.0   Final    ABO/Rh 03/26/2022 O POS   Final    Antibody Screen 03/26/2022 NEG   Final    SARS-CoV-2, NAAT 03/26/2022 Not Detected  Not Detected Final    Comment: Rapid NAAT:   Negative results should be treated as presumptive and,  if inconsistent with clinical signs and symptoms or necessary for  patient management, should be tested with an alternative molecular  assay. Negative results do not preclude SARS-CoV-2 infection and  should not be used as the sole basis for patient management decisions. This test has been authorized by the FDA under an Emergency Use  Authorization (EUA) for use by authorized laboratories. Fact sheet for Healthcare Providers:  Jhony.pavan  Fact sheet for Patients: Jhony.pavan    METHODOLOGY: Isothermal Nucleic Acid Amplification      RBC, UA 03/26/2022 0-2  0 - 2 /HPF Final    Bacteria, UA 03/26/2022 MANY* Negative /HPF Final    Yeast, UA 03/26/2022 Present* None Seen /HPF Final    Hyaline Casts, UA 03/26/2022 10-20  0 - 5 /HPF Final    WBC, UA 03/26/2022 20-50* 0 - 5 /HPF Final    Epithelial Cells, UA 03/26/2022 6-10  0 - 5 /HPF Final    Urine Culture, Routine 03/26/2022 *  Final                    Value:Cult,Urine:  YEAST  Cult,Urine:  NOT CANDIDA ALBICANS OR CANDIDA DUBLINIENSIS  Cult,Urine:  >[id number] CFU/ML  Performed at [address] [place] [address]  [phone]      Organism 03/26/2022 Aerococcus urinae*  Final    Urine Culture, Routine 03/26/2022    Final                    Value: There are no CLSI interpretive guidelines for routine  susceptibility testing. Aerococcus species are reported to  be susceptible to penicillin. A. urinae has also been  described as susceptible to amoxicillin and nitrofurantoin  (for treatment of urinary tract infections only). >[id number] CFU/ML  Susceptibility testing not routinely done. No further work up. Radiology   CTA HEAD W WO CONTRAST    Result Date: 3/26/2022  CTA HEAD WITH INTRAVENOUS CONTRAST MEDIUM. CLINICAL HISTORY:  Found down, nonresponsive. COMPARISON:  None TECHNIQUE: CTA head with intravenous contrast medium obtained and formatted as contiguous axial images. Thin cut, overlap, 3-D MIP, sagittal, and coronal reconstruction obtained during postprocessing. INTRAVENOUS CONTRAST MEDIUM: Isovue-300, 150 mL FINDINGS: Anterior communicating artery:[Patent]. Right anterior cerebral artery: [A1 segment patent.] [A2 segment patent.] Left anterior cerebral artery: [A1 segment patent.] [A2 segment absent. 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 Absent bilateral posterior communicating arteries. Extensive persistent fetal circulation. All CT scans at this facility use dose modulation, iterative reconstruction, and/or weight based dosing when appropriate to reduce radiation dose to as low as reasonably achievable. CT HEAD WO CONTRAST    Result Date: 3/26/2022  CT Brain. Contrast medium:  without contrast.. History: Altered mental status. Found down. Head laceration with swelling left eyebrow. Unresponsive. . Technical factors: CT imaging of the brain was obtained and formatted as 5 mm contiguous axial images.  2.5 mm contiguous axial images were obtained through the osseous structures. Sagittal and coronal reconstruction obtained during postprocessing. Comparison:  CT brain, June 4, 2021. Findings: Extra-axial spaces: Left frontal high attenuation extra-axial fluid with transverse diameter 5 mm (series 5, image 22), with local effacement of sulci. Finding extends caudally to left temporal region and posteriorly to left parietal region. Intracranial hemorrhage:  None. Ventricular system: Ventricles mildly enlarged. Sulci mildly prominent. Basal Cisterns:  Normal. Cerebral Parenchyma: Bilateral symmetric periventricular areas decreased attenuation. Midline Shift:  None. Cerebellum:  Normal. Paranasal sinuses, calvarium, and mastoid air cells: Extracalvarial left frontal, supraorbital, 1 x 2 cm scalp hematoma. Visualized Orbits: Remote bilateral ocular surgery. Impression: Left frontal, parietal, temporal, subdural hematoma with mass effect evidenced by localized effacement of sulci. Left frontal/supraorbital 1 x 2 cm scalp hematoma. Mild cerebral atrophy. Chronic ischemic white matter disease. All CT scans at this facility use dose modulation, iterative reconstruction, and/or weight based dosing when appropriate to reduce radiation dose to as low as reasonably achievable. CT FACIAL BONES WO CONTRAST    Result Date: 3/26/2022  CT facial bones without intravenous contrast medium. HISTORY: Found down. Nonresponsive. TECHNICAL FACTORS: CT facial bones obtained and formatted as 2.5 mm contiguous axial images. Sagittal and coronal reconstruction obtained during postprocessing. No intravenous contrast medium utilized. No prior CT facial bones available for comparison. FINDINGS: Congenital hypoplasia left frontal sinus. Right frontal sinus, bilateral ethmoid sinuses, bilateral maxillary sinuses patent. Rounded soft tissue attenuation right sphenoid sinus and left sphenoid sinus. Mastoid air cells well pneumatized bilaterally. Nasal septum midline.  Ostiomeatal complexes patent bilaterally. Bilateral ocular globes, extraocular muscles, optic nerves, retrobulbar fat without anomaly. Left frontal, temporal, and parietal subdural hematoma again identified. Extracalvarial soft tissue hematoma left frontal/supraorbital region. Extensive hyperostosis and pannus formation C1-C2. No acute fracture. Left frontal/parietal/temporal subdural hematoma. Left frontal scalp hematoma. Extensive hyperostosis and pannus formation C1-C2. Retention cysts, bilateral sphenoid sinuses. All CT scans at this facility use dose modulation, iterative reconstruction, and/or weight based dosing when appropriate to reduce radiation dose to as low as reasonably achievable. CTA NECK W WO CONTRAST    Result Date: 3/26/2022  EXAMINATION: CTA NECK WITH INTRAVENOUS CONTRAST MEDIUM. CLINICAL HISTORY: Found down, not responsive. COMPARISON:  None TECHNIQUE: CTA neck obtained and formatted as contiguous axial images from aortic arch to skull base. Thin cut, overlap, 3-D MIP, sagittal, coronal, right and left anterior oblique reconstruction obtained during postprocessing. Intravenous Contrast Medium: Isovue-300, 150 mL FINDINGS:  RIGHT CAROTID: Right common carotid artery: [Arises from right brachiocephalic trunk. Normal in course and caliber]. Right carotid bifurcation: Calcification at bifurcation resulting in 45% stenosis by NASCET criteria. Right internal carotid artery: [Cervical, petrous, lacerum, clinoid, cavernous, and communicating segments patent.] Calcification cavernous and communicating segments. LEFT CAROTID: Left common carotid artery: [Arises from aortic arch. Normal in course and caliber.] Left carotid bifurcation: Calcification at bifurcation, resulting in 55% stenosis by NASCET criteria.  Left internal carotid artery:[Cervical, petrous, lacerum, clinoid, cavernous, and communicating segments patent.] RIGHT VERTEBRAL: Right vertebral artery arises from right subclavian artery Pre foraminal, foraminal, extradural, and intradural segments patent. Right vertebral dominant. LEFT VERTEBRAL: Left vertebral artery arises from left subclavian artery. Pre foraminal, foraminal, extradural, and intradural segments patent. 45% stenosis, right internal carotid artery. 55% stenosis, left internal carotid artery. Internal carotid narrowings are estimated using NASCET criteria. Routine and volume rendered images were obtained on a 3-dimensional workstation. CT CHEST W CONTRAST    Result Date: 3/26/2022  CT OF THE CHEST, ABDOMEN, AND PELVIS with intravenous contrast medium. HISTORY: Found down. Not responsive. TECHNICAL FACTORS: CT imaging of the chest, abdomen, and pelvis, was obtained and formatted as 5 mm contiguous axial images from the thoracic inlet through the symphysis pubis. Sagittal and coronal reconstructions obtained during postprocessing. Intravenous contrast medium:  100 ml of Isovue-300 Comparison:   None  CT OF THE CHEST FINDINGS: Right lung: No nodules, masses, consolidation, pleural effusion, pneumothorax. Trace dependent subsegmental atelectatic change. Left lung: No nodules, masses, consolidation, pleural effusion, pneumothorax. Trace dependent subsegmental atelectatic change. Lymph nodes: No hilar, mediastinal, or axillary lymph node enlargement. Thoracic aorta: Normal in course and caliber. Cardiac Size: Normal. Pericardial effusion: None. Musculoskeletal:No osteoblastic, and no osteolytic lesions. Remote left fourth rib fracture. Trace dependent subsegmental atelectatic change. Remote left fourth rib fracture. CT OF THE ABDOMEN AND PELVIS WITH INTRAVENOUS CONTRAST MEDIUM. FINDINGS: Liver:  Normal in size, and shape. 6 x 8 mm cystlike area, anterior left hepatic lobe. Bile Ducts:  Normal in caliber. Gallbladder:  No stones or wall thickening. Pancreas:  Normal without masses, cysts, ductal dilatation or calcification. Spleen:  Normal in size without masses or calcifications.   No splenules. Kidneys:  Normal in size and enhancement. No hydronephrosis, or stones. Small bilateral parapelvic cysts. There Adrenals:  Normal. Small bowel:  Normal in caliber. Appendix:  Not visualized. Colon:  Normal in caliber. Peritoneum:  No free air. 4.2 x 3.6 x 3.6 cm area of cystic change, with high attenuation rim in its dependent margin, left upper lobe (series 2, image 55, series 601, image 35). Finding is separate from spleen located laterally, kidney located inferiorly, and adrenal located posteriorly. Finding separate from abdominal aorta. Finding abuts greater curve stomach. Vessels: Aorta normal in course and caliber. Diffuse aortoiliac atherosclerotic change. Portal vein, splenic vein, superior mesenteric vein are patent. Lymph nodes:  Retroperitoneal:  No enlarged retroperitoneal lymph nodes. Mesenteric:  No enlarged mesenteric lymph nodes. Pelvic: No enlarged pelvic lymph nodes. Ureters: Normal in course and caliber. No calcifications. Bladder: No wall thickening. Reproductive organs: 2.6 mm calcification, uterine body/fundus junction Abdominal Wall:  No hernia identified. No diastasis of rectus musculature. No edema or masses. Bones:  No bone lesions. No degenerative changes. No post operative changes. IMPRESSION: 4.2 x 3.6 x 3.6 cm area cystic change left upper quadrant with a small dependent rim of hemorrhage in its inferior margin. Finding appears separate from left kidney, left adrenal, spleen, and aorta. Finding abuts but is not definitely contiguous with gastric cardia. Uterine fibroid. All CT scans at this facility use dose modulation, iterative reconstruction, and/or weight based dosing when appropriate to reduce radiation dose to as low as reasonably achievable. CT CERVICAL SPINE WO CONTRAST    Result Date: 3/26/2022  CT cervical spine without intravenous contrast medium. HISTORY: Found down, nonresponsive.  TECHNICAL FACTORS: CT cervical spine obtained and formatted as 2.5 mm contiguous axial images from skull base to the level of. Sagittal and coronal reconstructions were obtained during postprocessing. No contrast medium was utilized. COMPARISON: None FINDINGS: Cervical vertebral bodies are normal in height and alignment. Atlantooccipital articulation maintained. Extensive hyperostosis and pannus formation. Atlantoaxial interval preserved. Neural foramina narrowing left C2-3, left C3-4, left C4-5, left C5-6. Disc space narrowing C5-6, with anterior osteophytes at that level. No fractures, dislocations, bone lesions. Limited imaging lung apices without anomaly. Carotid arteries and soft tissues are without anomaly. Extensive hyperostosis and pannus formation may reflect presence of rheumatoid disease, or remote trauma. No acute fracture identified. Marked multilevel degenerative changes discussed. All CT scans at this facility use dose modulation, iterative reconstruction, and/or weight based dosing when appropriate to reduce radiation dose to as low as reasonably achievable. CT THORACIC SPINE WO CONTRAST    Result Date: 3/26/2022  CT thoracic spine without intravenous contrast medium. HISTORY: Nonresponsive. Found down. TECHNICAL FACTORS: CT thoracic spine obtained and formatted as 2.5 mm contiguous axial images from skull base to the level of. Sagittal and coronal reconstructions were obtained during postprocessing. No contrast medium was utilized. Findings of CT chest, and abdomen, reported separately. COMPARISON: None FINDINGS: Thoracic vertebral bodies are normal in height and alignment  Disc spaces preserved. No fractures, dislocations, bone lesions. No fracture identified. All CT scans at this facility use dose modulation, iterative reconstruction, and/or weight based dosing when appropriate to reduce radiation dose to as low as reasonably achievable. CT LUMBAR SPINE WO CONTRAST    Result Date: 3/26/2022  CT lumbar spine without intravenous contrast medium. HISTORY: Found down. Nonresponsive. TECHNICAL FACTORS: CT lumbar spine obtained and formatted as 2.5 mm contiguous axial images from skull base to the level of. Sagittal and coronal reconstructions were obtained during postprocessing. No contrast medium was utilized. CT abdomen and pelvis, reported separately. COMPARISON: None FINDINGS: Lumbar vertebral bodies are normal in height and alignment. Disc spaces preserved. No fractures, dislocations, bone lesions. Combination facet hypertrophy, diffuse disc bulge, and ligamentum flavum hypertrophy, results in moderate central stenosis, L4-5. No fracture. Moderate central stenosis, L4-5. All CT scans at this facility use dose modulation, iterative reconstruction, and/or weight based dosing when appropriate to reduce radiation dose to as low as reasonably achievable. CT ABDOMEN PELVIS W IV CONTRAST Additional Contrast? None    Result Date: 3/26/2022  CT OF THE CHEST, ABDOMEN, AND PELVIS with intravenous contrast medium. HISTORY: Found down. Not responsive. TECHNICAL FACTORS: CT imaging of the chest, abdomen, and pelvis, was obtained and formatted as 5 mm contiguous axial images from the thoracic inlet through the symphysis pubis. Sagittal and coronal reconstructions obtained during postprocessing. Intravenous contrast medium:  100 ml of Isovue-300 Comparison:   None  CT OF THE CHEST FINDINGS: Right lung: No nodules, masses, consolidation, pleural effusion, pneumothorax. Trace dependent subsegmental atelectatic change. Left lung: No nodules, masses, consolidation, pleural effusion, pneumothorax. Trace dependent subsegmental atelectatic change. Lymph nodes: No hilar, mediastinal, or axillary lymph node enlargement. Thoracic aorta: Normal in course and caliber. Cardiac Size: Normal. Pericardial effusion: None. Musculoskeletal:No osteoblastic, and no osteolytic lesions. Remote left fourth rib fracture. Trace dependent subsegmental atelectatic change.  Remote left scans at this facility use dose modulation, iterative reconstruction, and/or weight based dosing when appropriate to reduce radiation dose to as low as reasonably achievable. XR CHEST PORTABLE    Result Date: 3/26/2022  Exam: XR CHEST PORTABLE History: Altered mental status Technique: AP portable view of the chest obtained. Comparison: Chest x-rays June 4, 2021 Findings: Atherosclerotic calcification of the thoracic aorta. The cardiomediastinal silhouette is within normal limits. No pneumothorax, pleural effusion, or consolidation. Bones of the thorax appear intact. Chronic left rib 4 deformity. No radiographic evidence of acute intrathoracic process. EKG: TRACING REVIEWED    TREATMENT PLAN:    Current treatment reviewed with patient  Continue Namenda for Memory imairment. Will adjust the dose depending on the response and tolerability  Continue trazodone for now PRN. If patient needed the medication regularly - will switch to scheduled medication  Follow up in 4 weeks  Consider anxiolytic if needed      Discussed with the patient risk, benefit, alternative and common side effects for the  proposed medication treatment. Patient is consenting to the treatment.     Electronically signed by Merlyn Murphy MD on 4/11/2022 at 5:35 PM

## 2022-04-16 ENCOUNTER — HOSPITAL ENCOUNTER (OUTPATIENT)
Age: 72
Setting detail: OBSERVATION
Discharge: NURSING, REHAB OR LONG-TERM CARE | End: 2022-04-17
Attending: INTERNAL MEDICINE | Admitting: INTERNAL MEDICINE
Payer: COMMERCIAL

## 2022-04-16 DIAGNOSIS — T85.598A OBSTRUCTION OF FEEDING TUBE, INITIAL ENCOUNTER: Primary | ICD-10-CM

## 2022-04-16 PROCEDURE — 96372 THER/PROPH/DIAG INJ SC/IM: CPT

## 2022-04-16 PROCEDURE — 6360000002 HC RX W HCPCS: Performed by: PHYSICIAN ASSISTANT

## 2022-04-16 PROCEDURE — 99284 EMERGENCY DEPT VISIT MOD MDM: CPT

## 2022-04-16 RX ORDER — LORAZEPAM 2 MG/ML
1 INJECTION INTRAMUSCULAR ONCE
Status: COMPLETED | OUTPATIENT
Start: 2022-04-16 | End: 2022-04-16

## 2022-04-16 RX ADMIN — LORAZEPAM 1 MG: 2 INJECTION INTRAMUSCULAR; INTRAVENOUS at 23:49

## 2022-04-16 ASSESSMENT — ENCOUNTER SYMPTOMS
TROUBLE SWALLOWING: 0
COLOR CHANGE: 0
SHORTNESS OF BREATH: 0
EYE PAIN: 0
APNEA: 0
ABDOMINAL PAIN: 0
ALLERGIC/IMMUNOLOGIC NEGATIVE: 1

## 2022-04-17 VITALS
SYSTOLIC BLOOD PRESSURE: 133 MMHG | BODY MASS INDEX: 30.73 KG/M2 | DIASTOLIC BLOOD PRESSURE: 73 MMHG | OXYGEN SATURATION: 100 % | HEIGHT: 64 IN | WEIGHT: 180 LBS | HEART RATE: 99 BPM | RESPIRATION RATE: 18 BRPM | TEMPERATURE: 97.3 F

## 2022-04-17 PROBLEM — R13.10 DYSPHAGIA: Status: ACTIVE | Noted: 2022-04-17

## 2022-04-17 LAB
ALBUMIN SERPL-MCNC: 3.6 G/DL (ref 3.5–4.6)
ALP BLD-CCNC: 94 U/L (ref 40–130)
ALT SERPL-CCNC: 21 U/L (ref 0–33)
ANION GAP SERPL CALCULATED.3IONS-SCNC: 9 MEQ/L (ref 9–15)
AST SERPL-CCNC: 22 U/L (ref 0–35)
BASOPHILS ABSOLUTE: 0.1 K/UL (ref 0–0.2)
BASOPHILS RELATIVE PERCENT: 0.6 %
BILIRUB SERPL-MCNC: 0.3 MG/DL (ref 0.2–0.7)
BUN BLDV-MCNC: 20 MG/DL (ref 8–23)
CALCIUM SERPL-MCNC: 9.7 MG/DL (ref 8.5–9.9)
CHLORIDE BLD-SCNC: 104 MEQ/L (ref 95–107)
CO2: 27 MEQ/L (ref 20–31)
CREAT SERPL-MCNC: 0.67 MG/DL (ref 0.5–0.9)
EOSINOPHILS ABSOLUTE: 0.3 K/UL (ref 0–0.7)
EOSINOPHILS RELATIVE PERCENT: 3.2 %
GFR AFRICAN AMERICAN: >60
GFR NON-AFRICAN AMERICAN: >60
GLOBULIN: 3.2 G/DL (ref 2.3–3.5)
GLUCOSE BLD-MCNC: 103 MG/DL (ref 70–99)
HCT VFR BLD CALC: 36.3 % (ref 37–47)
HEMOGLOBIN: 11.6 G/DL (ref 12–16)
LYMPHOCYTES ABSOLUTE: 1.9 K/UL (ref 1–4.8)
LYMPHOCYTES RELATIVE PERCENT: 17.6 %
MAGNESIUM: 2.2 MG/DL (ref 1.7–2.4)
MCH RBC QN AUTO: 27.4 PG (ref 27–31.3)
MCHC RBC AUTO-ENTMCNC: 32.1 % (ref 33–37)
MCV RBC AUTO: 85.3 FL (ref 82–100)
MONOCYTES ABSOLUTE: 0.7 K/UL (ref 0.2–0.8)
MONOCYTES RELATIVE PERCENT: 6.5 %
NEUTROPHILS ABSOLUTE: 7.8 K/UL (ref 1.4–6.5)
NEUTROPHILS RELATIVE PERCENT: 72.1 %
PDW BLD-RTO: 14.6 % (ref 11.5–14.5)
PLATELET # BLD: 372 K/UL (ref 130–400)
POTASSIUM SERPL-SCNC: 3.8 MEQ/L (ref 3.4–4.9)
RBC # BLD: 4.25 M/UL (ref 4.2–5.4)
SODIUM BLD-SCNC: 140 MEQ/L (ref 135–144)
TOTAL PROTEIN: 6.8 G/DL (ref 6.3–8)
WBC # BLD: 10.8 K/UL (ref 4.8–10.8)

## 2022-04-17 PROCEDURE — 36415 COLL VENOUS BLD VENIPUNCTURE: CPT

## 2022-04-17 PROCEDURE — G0378 HOSPITAL OBSERVATION PER HR: HCPCS

## 2022-04-17 PROCEDURE — 2580000003 HC RX 258: Performed by: PHYSICIAN ASSISTANT

## 2022-04-17 PROCEDURE — 96360 HYDRATION IV INFUSION INIT: CPT

## 2022-04-17 PROCEDURE — 83735 ASSAY OF MAGNESIUM: CPT

## 2022-04-17 PROCEDURE — 92610 EVALUATE SWALLOWING FUNCTION: CPT

## 2022-04-17 PROCEDURE — 80053 COMPREHEN METABOLIC PANEL: CPT

## 2022-04-17 PROCEDURE — 85025 COMPLETE CBC W/AUTO DIFF WBC: CPT

## 2022-04-17 PROCEDURE — 96361 HYDRATE IV INFUSION ADD-ON: CPT

## 2022-04-17 RX ORDER — SODIUM CHLORIDE 0.9 % (FLUSH) 0.9 %
5-40 SYRINGE (ML) INJECTION EVERY 12 HOURS SCHEDULED
Status: DISCONTINUED | OUTPATIENT
Start: 2022-04-17 | End: 2022-04-17 | Stop reason: HOSPADM

## 2022-04-17 RX ORDER — ACETAMINOPHEN 650 MG/1
650 SUPPOSITORY RECTAL EVERY 6 HOURS PRN
Status: DISCONTINUED | OUTPATIENT
Start: 2022-04-17 | End: 2022-04-17 | Stop reason: HOSPADM

## 2022-04-17 RX ORDER — ONDANSETRON 4 MG/1
4 TABLET, ORALLY DISINTEGRATING ORAL EVERY 8 HOURS PRN
Status: DISCONTINUED | OUTPATIENT
Start: 2022-04-17 | End: 2022-04-17 | Stop reason: HOSPADM

## 2022-04-17 RX ORDER — ACETAMINOPHEN 325 MG/1
650 TABLET ORAL EVERY 6 HOURS PRN
Status: DISCONTINUED | OUTPATIENT
Start: 2022-04-17 | End: 2022-04-17 | Stop reason: HOSPADM

## 2022-04-17 RX ORDER — SODIUM CHLORIDE 9 MG/ML
INJECTION, SOLUTION INTRAVENOUS PRN
Status: DISCONTINUED | OUTPATIENT
Start: 2022-04-17 | End: 2022-04-17 | Stop reason: HOSPADM

## 2022-04-17 RX ORDER — SODIUM CHLORIDE 0.9 % (FLUSH) 0.9 %
5-40 SYRINGE (ML) INJECTION PRN
Status: DISCONTINUED | OUTPATIENT
Start: 2022-04-17 | End: 2022-04-17 | Stop reason: HOSPADM

## 2022-04-17 RX ORDER — POLYETHYLENE GLYCOL 3350 17 G/17G
17 POWDER, FOR SOLUTION ORAL DAILY PRN
Status: DISCONTINUED | OUTPATIENT
Start: 2022-04-17 | End: 2022-04-17 | Stop reason: HOSPADM

## 2022-04-17 RX ORDER — ONDANSETRON 2 MG/ML
4 INJECTION INTRAMUSCULAR; INTRAVENOUS EVERY 6 HOURS PRN
Status: DISCONTINUED | OUTPATIENT
Start: 2022-04-17 | End: 2022-04-17 | Stop reason: HOSPADM

## 2022-04-17 RX ORDER — SODIUM CHLORIDE 9 MG/ML
INJECTION, SOLUTION INTRAVENOUS CONTINUOUS
Status: DISCONTINUED | OUTPATIENT
Start: 2022-04-17 | End: 2022-04-17 | Stop reason: HOSPADM

## 2022-04-17 RX ADMIN — SODIUM CHLORIDE: 9 INJECTION, SOLUTION INTRAVENOUS at 02:23

## 2022-04-17 NOTE — DISCHARGE SUMMARY
Hospital Medicine Discharge Summary    Clara Denton  :  1950  MRN:  [id number]    Admit date:  2022  Discharge date:  2022    Admitting Physician: Pedro Richardson MD  Primary Care Physician:  Rosmery Purdy MD    Clara Denton is a 73 y.o. female that was admitted and treated at [facility] for the following medical issues:     Principal Problem:    Dysphagia  Resolved Problems:    * No resolved hospital problems. *      Discharge Diagnoses:    Principal Problem:    Dysphagia  Resolved Problems:    * No resolved hospital problems. *    Chief Complaint   Patient presents with    Other     NG tube clogged per nursing home       Hospital Course:   Clara eDnton is a 73 y.o. female who was admitted for clogged corpak. Swallow eval performed, tolerated oral diet. dc'd without corpak  Pt was discharge in a stable condition. Recommended Diet and Intervention  Diet Solids Recommendation: Dysphagia Minced and Moist (Dysphagia II)  Liquid Consistency Recommendation: Thin  Recommended Form of Meds: PO  Recommendations: Assist feed;Dysphagia treatment; Therapeutic feeds with SLP only  Therapeutic Interventions: Patient/Family education,Therapeutic PO trials with SLP,Diet tolerance monitoring     Compensatory Swallowing Strategies  Compensatory Swallowing Strategies: Eat/Feed slowly; Assist feed;Upright as possible for all oral intake;Small bites/sips    /73   Pulse 99   Temp 97.3 °F (36.3 °C) (Oral)   Resp 18   Ht 5' 4\" (1.626 m)   Wt 180 lb (81.6 kg)   SpO2 100%   BMI 30.90 kg/m²     Patient was seen by the following consultants while admitted to [facility]:   Consults:  IP CONSULT TO GI    Significant Diagnostic Studies:    Refer to chart if  No results found.     Discharge Medications:         Medication List      CONTINUE taking these medications    alendronate 70 MG tablet  Commonly known as: FOSAMAX     Aspirin Buf(CaCarb-MgCarb-MgO) 81 MG Tabs atorvastatin 80 MG tablet  Commonly known as: LIPITOR     baclofen 10 MG tablet  Commonly known as: LIORESAL  TAKE ONE TABLET BY MOUTH FOUR TIMES A DAY     biotene Liqd oral solution  Swish and spit 15 mLs as needed (dry mouth)     Biotin 5 MG Caps  TAKE ONE CAPSULE BY MOUTH TWICE A DAY     ciprofloxacin 0.3 % ophthalmic solution  Commonly known as: CILOXAN     Compression Stockings Misc  by Does not apply route Edema, medium     Copaxone 20 MG/ML injection  Generic drug: glatiramer  INJECT 1ML SUBCUTNAOEUSLY ONCE DAILY     dalfampridine 10 MG extended release tablet  Commonly known as: AMPYRA  TAKE 1 TABLET BY MOUTH EVERY 12 HOURS.     donepezil 5 MG tablet  Commonly known as: ARICEPT  TAKE ONE TABLET BY MOUTH EVERY EVENING     Ecotrin Low Strength 81 MG EC tablet  Generic drug: aspirin     escitalopram 10 MG tablet  Commonly known as: Lexapro  Take 1 tablet by mouth daily     fluticasone 50 MCG/ACT nasal spray  Commonly known as: FLONASE     furosemide 20 MG tablet  Commonly known as: LASIX  TAKE ONE-HALF TABLET BY MOUTH EVERY DAY FOR 10 DAYS     magnesium oxide 400 (241.3 Mg) MG Tabs tablet  Commonly known as: MagOx 400  Take 1 tablet by mouth daily     magnesium oxide 400 MG tablet  Commonly known as: MAG-OX     memantine 5 MG tablet  Commonly known as: NAMENDA  TAKE ONE TABLET BY MOUTH TWICE A DAY     metoprolol succinate 50 MG extended release tablet  Commonly known as: TOPROL XL     MULTIVITAMIN ADULT PO     nitroGLYCERIN 0.3 MG SL tablet  Commonly known as: NITROSTAT     OMEGA-3 FATTY ACIDS PO     tolterodine 4 MG extended release capsule  Commonly known as: DETROL LA     trihexyphenidyl 2 MG tablet  Commonly known as: ARTANE  TAKE ONE TABLET BY MOUTH TWICE A DAY     valsartan 80 MG tablet  Commonly known as: DIOVAN     vitamin C 500 MG tablet  Commonly known as: ASCORBIC ACID              Disposition:   If discharged to Home, Any [facility] needs that were indicated and/or required as been addressed and set up by Social Work. Condition at discharge: Pt was medically stable at the time of discharge. Activity: activity as tolerated    Total time taken for discharging this patient: 40 minutes. Greater than 70% of time was spent focused exclusively on this patient. Time was taken to review chart, discuss plans with consultants, reconciling medications, discussing plan answering questions with patient.      Signed:  Kayley Youssef MD

## 2022-04-17 NOTE — FLOWSHEET NOTE
Report called to [facility] nurse Kamaljit Thakkar at [phone]. Patient to return on a mince moist thin liquid diet pills whole okay to use straws. Patient does not have any personal belongings. Discharged in stable condition via w/c.

## 2022-04-17 NOTE — H&P
DAILY 7/19/21   Duarte Patricia MD   dalfampridine (AMPYRA) 10 MG extended release tablet TAKE 1 TABLET BY MOUTH EVERY 12 HOURS. 5/3/21 6/2/21  Duarte Patricia MD   furosemide (LASIX) 20 MG tablet TAKE ONE-HALF TABLET BY MOUTH EVERY DAY FOR 10 DAYS 2/8/21   Duarte Patricia MD   Aspirin Buf,CaCarb-MgCarb-MgO, 81 MG TABS Take by mouth    Historical Provider, MD   fluticasone (FLONASE) 50 MCG/ACT nasal spray by Nasal route    Historical Provider, MD   magnesium oxide (MAG-OX) 400 MG tablet Take by mouth    Historical Provider, MD   magnesium oxide (MAGOX 400) 400 (241.3 Mg) MG TABS tablet Take 1 tablet by mouth daily 1/13/21   Duarte Patricia MD   Compression Stockings MISC by Does not apply route Edema, medium 1/13/21   Durate Patricia MD   Mouthwashes Janalee Alcorn) LIQD oral solution Swish and spit 15 mLs as needed (dry mouth) 2/23/20 1/13/21  Duarte Patricia MD   alendronate (FOSAMAX) 70 MG tablet Take 70 mg by mouth 5/21/14   Historical Provider, MD   aspirin (ECOTRIN LOW STRENGTH) 81 MG EC tablet Take 81 mg by mouth 5/21/14   Historical Provider, MD   atorvastatin (LIPITOR) 80 MG tablet Take 80 mg by mouth 5/21/14   Historical Provider, MD   ciprofloxacin (CILOXAN) 0.3 % ophthalmic solution 1 drop 4/23/15   Historical Provider, MD   metoprolol succinate (TOPROL XL) 50 MG extended release tablet Take 50 mg by mouth 5/26/15   Historical Provider, MD   nitroGLYCERIN (NITROSTAT) 0.3 MG SL tablet Place 0.3 mg under the tongue    Historical Provider, MD   tolterodine (DETROL LA) 4 MG extended release capsule Take 4 mg by mouth 5/21/14   Historical Provider, MD   valsartan (DIOVAN) 80 MG tablet Take 80 mg by mouth 5/21/14   Historical Provider, MD   vitamin C (ASCORBIC ACID) 500 MG tablet Take 500 mg by mouth 5/21/14   Historical Provider, MD   Multiple Vitamins-Minerals (MULTIVITAMIN ADULT PO) Take 1 tablet by mouth 5/21/14   Historical Provider, MD   OMEGA-3 FATTY ACIDS PO Take 1,000 mg by mouth 5/21/14   Historical Provider, MD escitalopram (LEXAPRO) 10 MG tablet Take 1 tablet by mouth daily 2/17/20 1/13/21  Grabiel Weaver MD       Allergies:    Patient has no known allergies. Social History:    reports that she has quit smoking. She has never used smokeless tobacco. She reports that she does not drink alcohol and does not use drugs. Family History:   History reviewed. No pertinent family history. PHYSICAL EXAM:  Vitals:  /89   Pulse 100   Temp 97.7 °F (36.5 °C) (Oral)   Resp 18   Ht 5' 4\" (1.626 m)   Wt 180 lb (81.6 kg)   SpO2 95%   BMI 30.90 kg/m²   General Appearance: alert and oriented to person, and time but not place,  in no acute distress  Skin: warm and dry, no rash or erythema  Head: normocephalic, laceration above left eye healing  Eyes: pupils equal, round, and reactive to light, extraocular eye movements intact, conjunctivae normal  ENT: tympanic membrane, external ear and ear canal normal bilaterally, nose without deformity, nasal mucosa and turbinates normal without polyps  Neck: supple and non-tender without mass, no thyromegaly or thyroid nodules, no cervical lymphadenopathy  Pulmonary/Chest: clear to auscultation bilaterally- no wheezes, rales or rhonchi   Cardiovascular: normal rate, regular rhythm, normal S1 and S2, no murmurs   Abdomen: soft, non-tender, non-distended, normal bowel sounds, no masses or organomegaly  Extremities: no cyanosis, clubbing or edema  Musculoskeletal: normal range of motion, no joint swelling, deformity or tenderness  Neurologic: reflexes normal and symmetric, no cranial nerve deficit          ASSESSMENT/ PLAN[de-identified]      Principal Problem:    Dysphagia  Resolved Problems:    * No resolved hospital problems. *      1.  Dysphagia   Status post head trauma with bleed 3 weeks ago   Unable to swallow since   Corpak was inserted for feeding and medication palpation at the nursing home but it was clogged   NG tube insertion was not successful in the ER   We will assess swallowing again in the morning to determine the need for PEG tube   In the meantime keep patient on IV fluids    Code Status: Full  DVT prophylaxis: None given the recent brain bleed       Electronically signed by Liu Latham MD on 4/17/2022 at 3:11 AM      NOTE: This report was transcribed using voice recognition software. Every effort was made to ensure accuracy; however, inadvertent computerized transcription errors may be present.

## 2022-04-17 NOTE — CARE COORDINATION
PT IS FROM [facility]. PT IN OBS, NO NEED FOR NG-PT NOW EATING. MESSAGE LEFT FOR BETTINA TO SEE IF PT CAN RETURN TODAY IF CLEARED BY DRS.

## 2022-04-17 NOTE — ED NOTES
Writer made several attempts to flush patient NG tube with no success       Shelly Ely RN  04/16/22 [id number]

## 2022-04-17 NOTE — PROGRESS NOTES
[facility]   Facility/Department: [facility]  Speech Language Pathology  Clinical Bedside Swallow Evaluation    Alexus Kuhn  : 1950 (73 y.o.)   MRN: [id number]  ROOM: [place] [address]  ADMISSION DATE: 2022  PATIENT DIAGNOSIS(ES): Dysphagia [R13.10]  Obstruction of feeding tube, initial encounter [T85.295R]  Chief Complaint   Patient presents with    Other     NG tube clogged per nursing home     Patient Active Problem List    Diagnosis Date Noted    Dysphagia 2022    Subdural hematoma ([facility] [address] 2022    Hypertension 2021    MS (multiple sclerosis) ([facility] [address] [facility] 2021    Abnormal mammogram 2021    Allergic rhinitis 2021    Atopic dermatitis 2021    Gastroesophageal reflux disease 2021    Hyperlipidemia 2021    Osteoporosis 2021    Spasm of bladder 2021    Ataxic gait 2020    Memory loss 2020    Urinary incontinence without sensory awareness 2020    Arteriosclerosis of coronary artery 2018    Pseudophakia of both eyes 2015    Retinal pigment epithelial mottling of macula 2015    After-cataract with vision obscured 2015    Anisometropia 2015    Lens replaced by other means 2015    Acute poliomyelitis 2015     Past Medical History:   Diagnosis Date    Hypertension     MI, acute, non ST segment elevation (HCC)     MS (multiple sclerosis) ([facility] [address]      History reviewed. No pertinent surgical history. No Known Allergies    DATE ONSET: 3/26/2022 (per RN report)    Date of Evaluation: 2022   Evaluating Therapist: JOLANTA Kirk    Recommended Diet and Intervention  Diet Solids Recommendation: Dysphagia Minced and Moist (Dysphagia II)  Liquid Consistency Recommendation: Thin  Recommended Form of Meds: PO  Recommendations: Assist feed;Dysphagia treatment; Therapeutic feeds with SLP only  Therapeutic Interventions: Patient/Family education,Therapeutic PO trials with SLP,Diet tolerance monitoring    Compensatory Swallowing Strategies  Compensatory Swallowing Strategies: Eat/Feed slowly; Assist feed;Upright as possible for all oral intake;Small bites/sips    Reason for Referral  Morteza Gongora was referred for a bedside swallow evaluation to assess the efficiency of her swallow function, identify signs and symptoms of aspiration, identify risk factors, and make recommendations regarding safe dietary consistencies, effective compensatory strategies, and safe eating environment. General  Chart Reviewed: Yes  Behavior/Cognition: Alert; Cooperative;Pleasant mood;Confused  Respiratory Status: Room air  O2 Device: None (Room air)  Communication Observation: Aphasia  Follows Directions: Simple  Dentition: Edentulous  Patient Positioning: Upright in bed  Baseline Vocal Quality: Normal  Volitional Cough: Strong  Prior Dysphagia History: Pt recently with head injury on March 26 with intracranial bleed. Corpak placed and NPO status made. Pt came to ER last night due to clog in corpak. Physician order to assess swallow to determine ability to return to PO status. Consistencies Administered: Dysphagia Minced and Moist (Dysphagia II); Dysphagia Pureed (Dysphagia I); Thin - teaspoon; Thin - straw; Thin - cup; Ice Chips;Dysphagia Soft and Bite-Sized (Dysphagia III)    Current Diet level:  Current Diet : NPO  Current Liquid Diet : NPO    Oral Motor Deficits  Oral/Motor  Oral Motor: Within functional limits    Oral Phase Dysfunction  Oral Phase  Oral Phase: Exceptions  Oral Phase Dysfunction  Impaired Mastication: Mechanical soft  Oral Phase  Oral Phase - Comment: Decreased mastication with soft solids noted. Pt typically wears upper dentures however they are not here with the patient. Indicators of Pharyngeal Phase Dysfunction   Pharyngeal Phase  Pharyngeal Phase: WFL  Pharyngeal Phase   Pharyngeal: No deficits noted with pharyngeal phase.     Impression  Dysphagia Diagnosis: Mild oral stage dysphagia  Dysphagia Impression : The pt presents with mild oral dysphagia at this time. ST suspects dysphagia secondary to edentualism noting difficulties with softer solids. Pt with good AP bolus movement, good oral clearance, good swallow onset, with no overt s/s of aspriation noted. Pt has been receiving nutrition via NG tube for previous 3 weeks. Dysphagia Outcome Severity Scale: Level 5: Mild dysphagia- Distant supervision. May need one diet consistency restricted     Treatment Plan  Requires SLP Intervention: Yes  Duration/Frequency of Treatment: 2x/week for 2 weeks          Treatment/Goals  Short-term Goals  Timeframe for Short-term Goals: 1-2 weeks  Goal 1: The pt will tolerate minced and moist diet texture with utilization of safe swallowing guidelines with >90% of opportunities. Goal 2: The pt will demonstrate understanding of safe swallowing guidelines with >90% of opportunities. Long-term Goals  Timeframe for Long-term Goals: 1-2 weeks  Goal 1: Pt will consume least restrictive diet consistency without signs of dysphagia and overt s/s of aspiration. Prognosis  Prognosis  Prognosis for safe diet advancement: good  Barriers to reach goals: cognitive deficits  Individuals consulted  Consulted and agree with results and recommendations: Patient;RN Marina Kaufman)    Education  Patient Education: Pt educated regarding initiation of PO diet. Pt educated on safe swallowing guidelines including upright positioning, slow rate, and small bolus size. Patient Education Response: Needs reinforcement;Verbalizes understanding  Safety Devices in place: Yes  Type of devices: All fall risk precautions in place    Pain Assessment:  Pre-Treatment  Pain assessment: 0-10  Pain level: 0  Intervention:  Patient denies pain. Post-Treatment  Pain assessment: 0-10  Pain level: 0  Intervention:  Patient denies pain.          Therapy Time  SLP Individual Minutes  Time In: 6723  Time Out: 0940  Minutes: 15 Signature: Electronically signed by JOLANTA Varma on 4/17/2022 at 9:56 AM

## 2022-04-17 NOTE — ED PROVIDER NOTES
[address] [facility] ED  eMERGENCYdEPARTMENT eNCOUnter      Pt Name: Helga Sheets  MRN: [id number]  Jamei 1950  Date of evaluation: 4/16/2022  Provider:Clifton Shipley PA-C    CHIEF COMPLAINT       Chief Complaint   Patient presents with    Other     NG tube clogged per nursing home         HISTORY OF PRESENT ILLNESS  (Location/Symptom, Timing/Onset, Context/Setting, Quality, Duration, Modifying Factors, Severity.)   Helga Sheets is a 73 y.o. female who presents to the emergency department with a clogged NG feeding tube. Patient has had this in for the past 3 weeks due to facial trauma and needing to be n.p.o.  Nursing staff states that they were unable to get the line to flush today. Patient is required to have NG tube for 1 more week. HPI    Nursing Notes were reviewed and I agree. REVIEW OF SYSTEMS    (2-9 systems for level 4, 10 or more for level 5)     Review of Systems   Constitutional: Negative for diaphoresis and fever. HENT: Negative for hearing loss and trouble swallowing. Eyes: Negative for pain. Respiratory: Negative for apnea and shortness of breath. Cardiovascular: Negative for chest pain. Gastrointestinal: Negative for abdominal pain. Endocrine: Negative. Genitourinary: Negative for hematuria. Musculoskeletal: Negative for neck pain and neck stiffness. Skin: Negative for color change. Allergic/Immunologic: Negative. Neurological: Negative for dizziness and numbness. Hematological: Negative. Psychiatric/Behavioral: Negative. All other systems reviewed and are negative. Except as noted above the remainder of the review of systems was reviewed and negative. PAST MEDICAL HISTORY     Past Medical History:   Diagnosis Date    Hypertension     MI, acute, non ST segment elevation ([facility] [address] [facility]     MS (multiple sclerosis) ([facility] [address]          SURGICAL HISTORY     History reviewed. No pertinent surgical history.       CURRENT MEDICATIONS       Previous Medications    ALENDRONATE (FOSAMAX) 70 MG TABLET    Take 70 mg by mouth    ASPIRIN (ECOTRIN LOW STRENGTH) 81 MG EC TABLET    Take 81 mg by mouth    ASPIRIN BUF,CACARB-MGCARB-MGO, 81 MG TABS    Take by mouth    ATORVASTATIN (LIPITOR) 80 MG TABLET    Take 80 mg by mouth    BACLOFEN (LIORESAL) 10 MG TABLET    TAKE ONE TABLET BY MOUTH FOUR TIMES A DAY    BIOTIN 5 MG CAPS    TAKE ONE CAPSULE BY MOUTH TWICE A DAY    CIPROFLOXACIN (CILOXAN) 0.3 % OPHTHALMIC SOLUTION    1 drop    COMPRESSION STOCKINGS MISC    by Does not apply route Edema, medium    COPAXONE 20 MG/ML INJECTION    INJECT 1ML SUBCUTNAOEUSLY ONCE DAILY    DALFAMPRIDINE (AMPYRA) 10 MG EXTENDED RELEASE TABLET    TAKE 1 TABLET BY MOUTH EVERY 12 HOURS.     DONEPEZIL (ARICEPT) 5 MG TABLET    TAKE ONE TABLET BY MOUTH EVERY EVENING    ESCITALOPRAM (LEXAPRO) 10 MG TABLET    Take 1 tablet by mouth daily    FLUTICASONE (FLONASE) 50 MCG/ACT NASAL SPRAY    by Nasal route    FUROSEMIDE (LASIX) 20 MG TABLET    TAKE ONE-HALF TABLET BY MOUTH EVERY DAY FOR 10 DAYS    MAGNESIUM OXIDE (MAG-OX) 400 MG TABLET    Take by mouth    MAGNESIUM OXIDE (MAGOX 400) 400 (241.3 MG) MG TABS TABLET    Take 1 tablet by mouth daily    MEMANTINE (NAMENDA) 5 MG TABLET    TAKE ONE TABLET BY MOUTH TWICE A DAY    METOPROLOL SUCCINATE (TOPROL XL) 50 MG EXTENDED RELEASE TABLET    Take 50 mg by mouth    MOUTHWASHES (BIOTENE) LIQD ORAL SOLUTION    Swish and spit 15 mLs as needed (dry mouth)    MULTIPLE VITAMINS-MINERALS (MULTIVITAMIN ADULT PO)    Take 1 tablet by mouth    NITROGLYCERIN (NITROSTAT) 0.3 MG SL TABLET    Place 0.3 mg under the tongue    OMEGA-3 FATTY ACIDS PO    Take 1,000 mg by mouth    TOLTERODINE (DETROL LA) 4 MG EXTENDED RELEASE CAPSULE    Take 4 mg by mouth    TRIHEXYPHENIDYL (ARTANE) 2 MG TABLET    TAKE ONE TABLET BY MOUTH TWICE A DAY    VALSARTAN (DIOVAN) 80 MG TABLET    Take 80 mg by mouth    VITAMIN C (ASCORBIC ACID) 500 MG TABLET    Take 500 mg by mouth       ALLERGIES     Patient has no known allergies. FAMILY HISTORY     History reviewed. No pertinent family history. SOCIAL HISTORY       Social History     Socioeconomic History    Marital status: Single     Spouse name: None    Number of children: None    Years of education: None    Highest education level: None   Occupational History    None   Tobacco Use    Smoking status: Former Smoker    Smokeless tobacco: Never Used   Substance and Sexual Activity    Alcohol use: Never    Drug use: Never    Sexual activity: Not Currently   Other Topics Concern    None   Social History Narrative    None     Social Determinants of Health     Financial Resource Strain:     Difficulty of Paying Living Expenses: Not on file   Food Insecurity:     Worried About Running Out of Food in the Last Year: Not on file    Franki of Food in the Last Year: Not on file   Transportation Needs:     Lack of Transportation (Medical): Not on file    Lack of Transportation (Non-Medical):  Not on file   Physical Activity:     Days of Exercise per Week: Not on file    Minutes of Exercise per Session: Not on file   Stress:     Feeling of Stress : Not on file   Social Connections:     Frequency of Communication with Friends and Family: Not on file    Frequency of Social Gatherings with Friends and Family: Not on file    Attends Judaism Services: Not on file    Active Member of [address] [place] [address] [employer] or Organizations: Not on file    Attends Club or Organization Meetings: Not on file    Marital Status: Not on file   Intimate Partner Violence:     Fear of Current or Ex-Partner: Not on file    Emotionally Abused: Not on file    Physically Abused: Not on file    Sexually Abused: Not on file   Housing Stability:     Unable to Pay for Housing in the Last Year: Not on file    Number of [place] in the Last Year: Not on file    Unstable Housing in the Last Year: Not on file       SCREENINGS    Aisha Coma Scale  Eye Opening: Spontaneous  Best Verbal Response: Oriented  Best Motor Response: Obeys commands  [place] Coma Scale Score: 15      PHYSICAL EXAM    (up to 7 forlevel 4, 8 or more for level 5)     ED Triage Vitals [04/16/22 2143]   BP Temp Temp Source Pulse Resp SpO2 Height Weight   137/88 97.7 °F (36.5 °C) Oral 93 18 99 % 5' 4\" (1.626 m) 180 lb (81.6 kg)       Physical Exam  Vitals and nursing note reviewed. Constitutional:       General: She is not in acute distress. Appearance: She is well-developed. She is not diaphoretic. HENT:      Head: Normocephalic and atraumatic. Mouth/Throat:      Pharynx: No oropharyngeal exudate. Eyes:      General: No scleral icterus. Conjunctiva/sclera: Conjunctivae normal.      Pupils: Pupils are equal, round, and reactive to light. Neck:      Trachea: No tracheal deviation. Cardiovascular:      Rate and Rhythm: Normal rate. Heart sounds: Normal heart sounds. Pulmonary:      Effort: Pulmonary effort is normal. No respiratory distress. Breath sounds: Normal breath sounds. Abdominal:      General: Bowel sounds are normal. There is no distension. Palpations: Abdomen is soft. Musculoskeletal:         General: Normal range of motion. Cervical back: Normal range of motion and neck supple. Skin:     General: Skin is warm and dry. Findings: No erythema or rash. Neurological:      Mental Status: She is alert and oriented to person, place, and time. Cranial Nerves: No cranial nerve deficit. Motor: No abnormal muscle tone. Psychiatric:         Behavior: Behavior normal.         Thought Content:  Thought content normal.         Judgment: Judgment normal.           DIAGNOSTIC RESULTS     RADIOLOGY:   Non-plain film images such as CT, Ultrasound and MRI are read by the radiologist. Plain radiographic images are visualized and preliminarilyinterpreted by Elvira Collier PA-C with the below findings:      Interpretation per the Radiologist below, if available at the time of this note:    No orders to display       LABS:  Labs Reviewed   COMPREHENSIVE METABOLIC PANEL   CBC WITH AUTO DIFFERENTIAL   MAGNESIUM       All other labs were within normal range or not returnedas of this dictation. EMERGENCYDEPARTMENT COURSE and DIFFERENTIAL DIAGNOSIS/MDM:   Vitals:    Vitals:    04/16/22 2143 04/17/22 0008   BP: 137/88 126/89   Pulse: 93 100   Resp: 18 18   Temp: 97.7 °F (36.5 °C)    TempSrc: Oral    SpO2: 99% 95%   Weight: 180 lb (81.6 kg)    Height: 5' 4\" (1.626 m)        REASSESSMENT      Patient presented the emergency department with a blocked CorPak feeding tube. Multiple attempts to place NG were unsuccessful. Discussed with the hospitalist and patient will be admitted for replacement PEG tube given that the patient still has an n.p.o. status for 1 more week    MDM    PROCEDURES:    Procedures      FINAL IMPRESSION      1. Obstruction of feeding tube, initial encounter          DISPOSITION/PLAN   DISPOSITION Admitted 04/17/2022 01:33:16 AM      PATIENT REFERRED TO:  No follow-up provider specified.     DISCHARGE MEDICATIONS:  New Prescriptions    No medications on file       (Please note that portions of this note were completed with a voice recognition program.  Efforts were made to edit the dictations but occasionally words are mis-transcribed.)    YONY Ambrosio PA-C  04/17/22 0134

## 2022-04-17 NOTE — ED NOTES
Per Fatou CARPENTER patient is to receive a new NG tube  2 attempts made patient did not tolerated well  Provider at patient bedside, to come to a resolution for patient next step     Otoniel Clayton RN  04/16/22 6034

## 2022-04-17 NOTE — DISCHARGE INSTR - COC
Continuity of Care Form    Patient Name: Silvino Sullivan   :  1950  MRN:  [id number]    Admit date:  2022  Discharge date:  2022    Code Status Order: Full Code   Advance Directives:      Admitting Physician: Myles Duron MD  PCP: Anila Padron MD    Discharging Nurse: [facility] Unit/Room#: Q144/E770-79  Discharging Unit Phone Number: [phone]    Emergency Contact:   Extended Emergency Contact Information  Primary Emergency Contact: [address] Phone: [phone]  Relation: Other  Secondary Emergency Contact: Janis Jacobson  Mobile Phone: [phone]  Relation: Brother/Sister    Past Surgical History:  History reviewed. No pertinent surgical history. Immunization History: There is no immunization history on file for this patient.     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    Ataxic gait R26.0    Memory loss R41.3    Urinary incontinence without sensory awareness N39.42    Abnormal mammogram R92.8    Allergic rhinitis J30.9    Atopic dermatitis L20.9    Gastroesophageal reflux disease K21.9    Hyperlipidemia E78.5    Osteoporosis M81.0    Spasm of bladder N32.89    MS (multiple sclerosis) ([address] G35    Arteriosclerosis of coronary artery I25.10    Hypertension I10    Subdural hematoma ([address] I31.6E5C    Dysphagia R13.10       Isolation/Infection:   Isolation            No Isolation          Patient Infection Status       Infection Onset Added Last Indicated Last Indicated By Review Planned Expiration Resolved Resolved By    None active    Resolved    COVID-19 (Rule Out) 22 COVID-19, Rapid (Ordered)   22 Rule-Out Test Resulted            Nurse Assessment:  Last Vital Signs: /73   Pulse 99   Temp 97.3 °F (36.3 °C) (Oral)   Resp 18   Ht 5' 4\" (1.626 m)   Wt 180 lb (81.6 kg)   SpO2 100%   BMI 30.90 kg/m²     Last documented pain score (0-10 scale):    Last Weight:   Wt Readings from Last 1 Encounters:   04/16/22 180 lb (81.6 kg)     Mental Status:  oriented and alert    IV Access:  - None    Nursing Mobility/ADLs:  Walking   Assisted  Transfer  Assisted  Bathing  Assisted  Dressing  Assisted  Toileting  Assisted  Feeding  Assisted  Med Admin  Assisted  Med Delivery   whole    Wound Care Documentation and Therapy:  Wound 03/26/22 Head Anterior; Left (Active)   Number of days: 22        Elimination:  Continence: Bowel: No  Bladder: No  Urinary Catheter: None   Colostomy/Ileostomy/Ileal Conduit: No       Date of Last BM: unknown    No intake or output data in the 24 hours ending 04/17/22 1422  No intake/output data recorded. Safety Concerns:     History of Falls (last 30 days) and At Risk for Falls    Impairments/Disabilities:      None    Nutrition Therapy:  Current Nutrition Therapy:   - Oral Diet:  Dysphagia Mince moist thin liquids pills whole    Routes of Feeding: Oral  Liquids: Thin Liquids  Daily Fluid Restriction: no  Last Modified Barium Swallow with Video (Video Swallowing Test): not done    Treatments at the Time of Hospital Discharge:   Respiratory Treatments: N/A  Oxygen Therapy:  is not on home oxygen therapy.   Ventilator:    - No ventilator support    Rehab Therapies: Physical Therapy, Occupational Therapy, and Speech/Language Therapy  Weight Bearing Status/Restrictions: No weight bearing restrictions  Other Medical Equipment (for information only, NOT a DME order):  wheelchair and walker  Other Treatments: ***    Patient's personal belongings (please select all that are sent with patient):  Dentures upper    RN SIGNATURE:  Electronically signed by Christy Martínez RN on 4/17/22 at 3:28 PM EDT    CASE MANAGEMENT/SOCIAL WORK SECTION    Inpatient Status Date: ***    Readmission Risk Assessment Score:  Readmission Risk              Risk of Unplanned Readmission:  0           Discharging to Facility/ Agency   Name:   Address:  Phone:  Fax:    Dialysis Facility (if applicable)   Name:  Address:  Dialysis Schedule:  Phone:  Fax:    / signature: {Melizagnature:[id number]}    PHYSICIAN SECTION    Prognosis: Fair    Condition at Discharge: Stable    Recommended Labs or Other Treatments After Discharge:     Recommended Diet and Intervention  Diet Solids Recommendation: Dysphagia Minced and Moist (Dysphagia II)  Liquid Consistency Recommendation: Thin  Recommended Form of Meds: PO  Recommendations: Assist feed;Dysphagia treatment; Therapeutic feeds with SLP only  Therapeutic Interventions: Patient/Family education,Therapeutic PO trials with SLP,Diet tolerance monitoring     Compensatory Swallowing Strategies  Compensatory Swallowing Strategies: Eat/Feed slowly; Assist feed;Upright as possible for all oral intake;Small bites/sips    Physician Certification: I certify the above information and transfer of Ramesh Plunkett  is necessary for the continuing treatment of the diagnosis listed and that she requires [place] for less 30 days.      Update Admission H&P: No change in H&P    PHYSICIAN SIGNATURE:  Electronically signed by Nettie Rodríguez MD on 4/17/22 at 2:22 PM EDT

## 2022-06-07 ENCOUNTER — OFFICE VISIT (OUTPATIENT)
Dept: GERIATRIC MEDICINE | Age: 72
End: 2022-06-07
Payer: COMMERCIAL

## 2022-06-07 DIAGNOSIS — F02.80 DEMENTIA ASSOCIATED WITH OTHER UNDERLYING DISEASE WITHOUT BEHAVIORAL DISTURBANCE (HCC): ICD-10-CM

## 2022-06-07 DIAGNOSIS — F41.1 GAD (GENERALIZED ANXIETY DISORDER): Primary | ICD-10-CM

## 2022-06-07 PROCEDURE — 99309 SBSQ NF CARE MODERATE MDM 30: CPT | Performed by: PSYCHIATRY & NEUROLOGY

## 2022-06-07 PROCEDURE — 1123F ACP DISCUSS/DSCN MKR DOCD: CPT | Performed by: PSYCHIATRY & NEUROLOGY

## 2022-06-09 NOTE — PROGRESS NOTES
[facility]       CHIEF COMPLAINT:  anxiety    History obtained from:  patient    Patient was seen after discussing with the treatment team and reviewing the chart      HISTORY OF PRESENT ILLNESS:      The patient is a 81 y.o. female with significant past history of dementia and anxiety    Pt was recently admitted to SNF following a fall at home with left frontal, parietal, temporal, subdural hematoma with mass effect and laceration of left eyebrow  Pt denies feeling depressed or suicidal.  Denies any hopeless or worthless feeling  Not having any active SI or HI  Feeling anxious and jittery most days which she find difficult to control  Memory issues around short term memory although her remote memory seem to be intact    Stressors: recent fall and head tauma, physical decline    The patient is not currently receiving care for the above psychiatric illness. MEDICATIONS: reviewed per [facility]    Compliance: yes    Psychiatric Review of Systems       Depression: denies     Maryellen or Hypomania:  no     Panic Attacks:  yes      Phobias:  no     Obsessions and Compulsions:  no     PTSD : no     Hallucinations:  no     Delusions:  no    Substance Abuse History:  ETOH: no   Marijuana: no  Opiates: no  Other Drugs: no      Past Psychiatric History:  Denies any past illness other than anxiety disorder    Past Medical History:        Diagnosis Date    Hypertension     MI, acute, non ST segment elevation (HCC)     MS (multiple sclerosis) ([facility] [address]        Past Surgical History:    No past surgical history on file. Allergies:   Patient has no known allergies. Family History  No family history on file.       Social History:  Social History     Socioeconomic History    Marital status: Single     Spouse name: Not on file    Number of children: Not on file    Years of education: Not on file    Highest education level: Not on file   Occupational History    Not on file   Tobacco Use    Smoking status: Former Smoker    Smokeless tobacco: Never Used   Substance and Sexual Activity    Alcohol use: Never    Drug use: Never    Sexual activity: Not Currently   Other Topics Concern    Not on file   Social History Narrative    Not on file     Social Determinants of Health     Financial Resource Strain:     Difficulty of Paying Living Expenses: Not on file   Food Insecurity:     Worried About Running Out of Food in the Last Year: Not on file    Franki of Food in the Last Year: Not on file   Transportation Needs:     Lack of Transportation (Medical): Not on file    Lack of Transportation (Non-Medical): Not on file   Physical Activity:     Days of Exercise per Week: Not on file    Minutes of Exercise per Session: Not on file   Stress:     Feeling of Stress : Not on file   Social Connections:     Frequency of Communication with Friends and Family: Not on file    Frequency of Social Gatherings with Friends and Family: Not on file    Attends [employer] Services: Not on file    Active Member of [address] [place] [address] or Organizations: Not on file    Attends Club or Organization Meetings: Not on file    Marital Status: Not on file   Intimate Partner Violence:     Fear of Current or Ex-Partner: Not on file    Emotionally Abused: Not on file    Physically Abused: Not on file    Sexually Abused: Not on file   Housing Stability:     Unable to Pay for Housing in the Last Year: Not on file    Number of [place] in the Last Year: Not on file    Unstable Housing in the Last Year: Not on file       EXAMINATION:    REVIEW OF SYSTEMS:    ROS:  [x] All negative/unchanged except if checked. Explain positive(checked items) below:  [] Constitutional  [] Eyes  [] Ear/Nose/Mouth/Throat  [] Respiratory  [] CV  [] GI  []   [] Musculoskeletal  [] Skin/Breast  [x] Neurological- weakness  [] Endocrine  [] Heme/Lymph  [] Allergic/Immunologic    Explanation:     Vitals: There were no vitals taken for this visit.      Neurologic Exam:   Muscle Strength & Tone: full ROM  Gait: in bed   Involuntary Movements: No    Mental Status Examination:    Level of consciousness:  within normal limits   Appearance:  ill-appearing  Behavior/Motor:  psychomotor retardation  Attitude toward examiner:  cooperative  Speech:  slow   Mood: anxious  Affect:  mood congruent  Thought processes:  slow   Thought content:  Suicidal Ideation:  denies suicidal ideation  Cognition:  oriented to person, place, and time   Concentration poor  Memory impaired recent memory  Insight fair   Judgement poor   Fund of Knowledge limited        DIAGNOSIS:     Anxiety disorder unspecified   Cognitive deficit- early dementia         LABS:   No visits with results within 2 Day(s) from this visit. Latest known visit with results is:   Admission on 04/16/2022, Discharged on 04/17/2022   Component Date Value Ref Range Status    Sodium 04/17/2022 140  135 - 144 mEq/L Final    Potassium 04/17/2022 3.8  3.4 - 4.9 mEq/L Final    Chloride 04/17/2022 104  95 - 107 mEq/L Final    CO2 04/17/2022 27  20 - 31 mEq/L Final    Anion Gap 04/17/2022 9  9 - 15 mEq/L Final    Glucose 04/17/2022 103* 70 - 99 mg/dL Final    BUN 04/17/2022 20  8 - 23 mg/dL Final    CREATININE 04/17/2022 0.67  0.50 - 0.90 mg/dL Final    GFR Non- 04/17/2022 >60.0  >60 Final    Comment: >60 mL/min/1.73m2 EGFR, calc. for ages 25 and older using the  MDRD formula (not corrected for weight), is valid for stable  renal function.  GFR  04/17/2022 >60.0  >60 Final    Comment: >60 mL/min/1.73m2 EGFR, calc. for ages 25 and older using the  MDRD formula (not corrected for weight), is valid for stable  renal function.       Calcium 04/17/2022 9.7  8.5 - 9.9 mg/dL Final    Total Protein 04/17/2022 6.8  6.3 - 8.0 g/dL Final    Albumin 04/17/2022 3.6  3.5 - 4.6 g/dL Final    Total Bilirubin 04/17/2022 0.3  0.2 - 0.7 mg/dL Final    Alkaline Phosphatase 04/17/2022 94  40 - 130 U/L Final    ALT 04/17/2022 21  0 - 33 U/L Final    AST 04/17/2022 22  0 - 35 U/L Final    Globulin 04/17/2022 3.2  2.3 - 3.5 g/dL Final    WBC 04/17/2022 10.8  4.8 - 10.8 K/uL Final    RBC 04/17/2022 4.25  4.20 - 5.40 M/uL Final    Hemoglobin 04/17/2022 11.6* 12.0 - 16.0 g/dL Final    Hematocrit 04/17/2022 36.3* 37.0 - 47.0 % Final    MCV 04/17/2022 85.3  82.0 - 100.0 fL Final    MCH 04/17/2022 27.4  27.0 - 31.3 pg Final    MCHC 04/17/2022 32.1* 33.0 - 37.0 % Final    RDW 04/17/2022 14.6* 11.5 - 14.5 % Final    Platelets 95/01/7615 372  130 - 400 K/uL Final    Neutrophils % 04/17/2022 72.1  % Final    Lymphocytes % 04/17/2022 17.6  % Final    Monocytes % 04/17/2022 6.5  % Final    Eosinophils % 04/17/2022 3.2  % Final    Basophils % 04/17/2022 0.6  % Final    Neutrophils Absolute 04/17/2022 7.8* 1.4 - 6.5 K/uL Final    Lymphocytes Absolute 04/17/2022 1.9  1.0 - 4.8 K/uL Final    Monocytes Absolute 04/17/2022 0.7  0.2 - 0.8 K/uL Final    Eosinophils Absolute 04/17/2022 0.3  0.0 - 0.7 K/uL Final    Basophils Absolute 04/17/2022 0.1  0.0 - 0.2 K/uL Final    Magnesium 04/17/2022 2.2  1.7 - 2.4 mg/dL Final           Radiology   CTA HEAD W WO CONTRAST    Result Date: 3/26/2022  CTA HEAD WITH INTRAVENOUS CONTRAST MEDIUM. CLINICAL HISTORY:  Found down, nonresponsive. COMPARISON:  None TECHNIQUE: CTA head with intravenous contrast medium obtained and formatted as contiguous axial images. Thin cut, overlap, 3-D MIP, sagittal, and coronal reconstruction obtained during postprocessing. INTRAVENOUS CONTRAST MEDIUM: Isovue-300, 150 mL FINDINGS: Anterior communicating artery:[Patent]. Right anterior cerebral artery: [A1 segment patent.] [A2 segment patent.] Left anterior cerebral artery: [A1 segment patent.] [A2 segment absent.  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 Absent bilateral posterior communicating arteries. Extensive persistent fetal circulation. All CT scans at this facility use dose modulation, iterative reconstruction, and/or weight based dosing when appropriate to reduce radiation dose to as low as reasonably achievable. CT HEAD WO CONTRAST    Result Date: 3/26/2022  CT Brain. Contrast medium:  without contrast.. History: Altered mental status. Found down. Head laceration with swelling left eyebrow. Unresponsive. . Technical factors: CT imaging of the brain was obtained and formatted as 5 mm contiguous axial images. 2.5 mm contiguous axial images were obtained through the osseous structures. Sagittal and coronal reconstruction obtained during postprocessing. Comparison:  CT brain, June 4, 2021. Findings: Extra-axial spaces: Left frontal high attenuation extra-axial fluid with transverse diameter 5 mm (series 5, image 22), with local effacement of sulci. Finding extends caudally to left temporal region and posteriorly to left parietal region. Intracranial hemorrhage:  None. Ventricular system: Ventricles mildly enlarged. Sulci mildly prominent. Basal Cisterns:  Normal. Cerebral Parenchyma: Bilateral symmetric periventricular areas decreased attenuation. Midline Shift:  None. Cerebellum:  Normal. Paranasal sinuses, calvarium, and mastoid air cells: Extracalvarial left frontal, supraorbital, 1 x 2 cm scalp hematoma. Visualized Orbits: Remote bilateral ocular surgery.      Impression: Left frontal, parietal, temporal, subdural hematoma with mass effect evidenced by localized effacement of sulci. Left frontal/supraorbital 1 x 2 cm scalp hematoma. Mild cerebral atrophy. Chronic ischemic white matter disease. All CT scans at this facility use dose modulation, iterative reconstruction, and/or weight based dosing when appropriate to reduce radiation dose to as low as reasonably achievable. CT FACIAL BONES WO CONTRAST    Result Date: 3/26/2022  CT facial bones without intravenous contrast medium. HISTORY: Found down. Nonresponsive. TECHNICAL FACTORS: CT facial bones obtained and formatted as 2.5 mm contiguous axial images. Sagittal and coronal reconstruction obtained during postprocessing. No intravenous contrast medium utilized. No prior CT facial bones available for comparison. FINDINGS: Congenital hypoplasia left frontal sinus. Right frontal sinus, bilateral ethmoid sinuses, bilateral maxillary sinuses patent. Rounded soft tissue attenuation right sphenoid sinus and left sphenoid sinus. Mastoid air cells well pneumatized bilaterally. Nasal septum midline. Ostiomeatal complexes patent bilaterally. Bilateral ocular globes, extraocular muscles, optic nerves, retrobulbar fat without anomaly. Left frontal, temporal, and parietal subdural hematoma again identified. Extracalvarial soft tissue hematoma left frontal/supraorbital region. Extensive hyperostosis and pannus formation C1-C2. No acute fracture. Left frontal/parietal/temporal subdural hematoma. Left frontal scalp hematoma. Extensive hyperostosis and pannus formation C1-C2. Retention cysts, bilateral sphenoid sinuses. All CT scans at this facility use dose modulation, iterative reconstruction, and/or weight based dosing when appropriate to reduce radiation dose to as low as reasonably achievable. CTA NECK W WO CONTRAST    Result Date: 3/26/2022  EXAMINATION: CTA NECK WITH INTRAVENOUS CONTRAST MEDIUM. CLINICAL HISTORY: Found down, not responsive.  COMPARISON:  None TECHNIQUE: CTA neck obtained and formatted as contiguous axial images from aortic arch to skull base. Thin cut, overlap, 3-D MIP, sagittal, coronal, right and left anterior oblique reconstruction obtained during postprocessing. Intravenous Contrast Medium: Isovue-300, 150 mL FINDINGS:  RIGHT CAROTID: Right common carotid artery: [Arises from right brachiocephalic trunk. Normal in course and caliber]. Right carotid bifurcation: Calcification at bifurcation resulting in 45% stenosis by NASCET criteria. Right internal carotid artery: [Cervical, petrous, lacerum, clinoid, cavernous, and communicating segments patent.] Calcification cavernous and communicating segments. LEFT CAROTID: Left common carotid artery: [Arises from aortic arch. Normal in course and caliber.] Left carotid bifurcation: Calcification at bifurcation, resulting in 55% stenosis by NASCET criteria. Left internal carotid artery:[Cervical, petrous, lacerum, clinoid, cavernous, and communicating segments patent.] RIGHT VERTEBRAL: Right vertebral artery arises from right subclavian artery Pre foraminal, foraminal, extradural, and intradural segments patent. Right vertebral dominant. LEFT VERTEBRAL: Left vertebral artery arises from left subclavian artery. Pre foraminal, foraminal, extradural, and intradural segments patent. 45% stenosis, right internal carotid artery. 55% stenosis, left internal carotid artery. Internal carotid narrowings are estimated using NASCET criteria. Routine and volume rendered images were obtained on a 3-dimensional workstation. CT CHEST W CONTRAST    Result Date: 3/26/2022  CT OF THE CHEST, ABDOMEN, AND PELVIS with intravenous contrast medium. HISTORY: Found down. Not responsive. TECHNICAL FACTORS: CT imaging of the chest, abdomen, and pelvis, was obtained and formatted as 5 mm contiguous axial images from the thoracic inlet through the symphysis pubis. Sagittal and coronal reconstructions obtained during postprocessing. Intravenous contrast medium:  100 ml of Isovue-300 Comparison:   None  CT OF THE CHEST FINDINGS: Right lung: No nodules, masses, consolidation, pleural effusion, pneumothorax. Trace dependent subsegmental atelectatic change. Left lung: No nodules, masses, consolidation, pleural effusion, pneumothorax. Trace dependent subsegmental atelectatic change. Lymph nodes: No hilar, mediastinal, or axillary lymph node enlargement. Thoracic aorta: Normal in course and caliber. Cardiac Size: Normal. Pericardial effusion: None. Musculoskeletal:No osteoblastic, and no osteolytic lesions. Remote left fourth rib fracture. Trace dependent subsegmental atelectatic change. Remote left fourth rib fracture. CT OF THE ABDOMEN AND PELVIS WITH INTRAVENOUS CONTRAST MEDIUM. FINDINGS: Liver:  Normal in size, and shape. 6 x 8 mm cystlike area, anterior left hepatic lobe. Bile Ducts:  Normal in caliber. Gallbladder:  No stones or wall thickening. Pancreas:  Normal without masses, cysts, ductal dilatation or calcification. Spleen:  Normal in size without masses or calcifications. No splenules. Kidneys:  Normal in size and enhancement. No hydronephrosis, or stones. Small bilateral parapelvic cysts. There Adrenals:  Normal. Small bowel:  Normal in caliber. Appendix:  Not visualized. Colon:  Normal in caliber. Peritoneum:  No free air. 4.2 x 3.6 x 3.6 cm area of cystic change, with high attenuation rim in its dependent margin, left upper lobe (series 2, image 55, series 601, image 35). Finding is separate from spleen located laterally, kidney located inferiorly, and adrenal located posteriorly. Finding separate from abdominal aorta. Finding abuts greater curve stomach. Vessels: Aorta normal in course and caliber. Diffuse aortoiliac atherosclerotic change. Portal vein, splenic vein, superior mesenteric vein are patent. Lymph nodes:  Retroperitoneal:  No enlarged retroperitoneal lymph nodes. Mesenteric:  No enlarged mesenteric lymph nodes. Pelvic: No enlarged pelvic lymph nodes. Ureters: Normal in course and caliber. No calcifications. Bladder: No wall thickening. Reproductive organs: 2.6 mm calcification, uterine body/fundus junction Abdominal Wall:  No hernia identified. No diastasis of rectus musculature. No edema or masses. Bones:  No bone lesions. No degenerative changes. No post operative changes. IMPRESSION: 4.2 x 3.6 x 3.6 cm area cystic change left upper quadrant with a small dependent rim of hemorrhage in its inferior margin. Finding appears separate from left kidney, left adrenal, spleen, and aorta. Finding abuts but is not definitely contiguous with gastric cardia. Uterine fibroid. All CT scans at this facility use dose modulation, iterative reconstruction, and/or weight based dosing when appropriate to reduce radiation dose to as low as reasonably achievable. CT CERVICAL SPINE WO CONTRAST    Result Date: 3/26/2022  CT cervical spine without intravenous contrast medium. HISTORY: Found down, nonresponsive. TECHNICAL FACTORS: CT cervical spine obtained and formatted as 2.5 mm contiguous axial images from skull base to the level of. Sagittal and coronal reconstructions were obtained during postprocessing. No contrast medium was utilized. COMPARISON: None FINDINGS: Cervical vertebral bodies are normal in height and alignment. Atlantooccipital articulation maintained. Extensive hyperostosis and pannus formation. Atlantoaxial interval preserved. Neural foramina narrowing left C2-3, left C3-4, left C4-5, left C5-6. Disc space narrowing C5-6, with anterior osteophytes at that level. No fractures, dislocations, bone lesions. Limited imaging lung apices without anomaly. Carotid arteries and soft tissues are without anomaly. Extensive hyperostosis and pannus formation may reflect presence of rheumatoid disease, or remote trauma. No acute fracture identified. Marked multilevel degenerative changes discussed.  All CT scans at this facility use dose modulation, iterative reconstruction, and/or weight based dosing when appropriate to reduce radiation dose to as low as reasonably achievable. CT THORACIC SPINE WO CONTRAST    Result Date: 3/26/2022  CT thoracic spine without intravenous contrast medium. HISTORY: Nonresponsive. Found down. TECHNICAL FACTORS: CT thoracic spine obtained and formatted as 2.5 mm contiguous axial images from skull base to the level of. Sagittal and coronal reconstructions were obtained during postprocessing. No contrast medium was utilized. Findings of CT chest, and abdomen, reported separately. COMPARISON: None FINDINGS: Thoracic vertebral bodies are normal in height and alignment  Disc spaces preserved. No fractures, dislocations, bone lesions. No fracture identified. All CT scans at this facility use dose modulation, iterative reconstruction, and/or weight based dosing when appropriate to reduce radiation dose to as low as reasonably achievable. CT LUMBAR SPINE WO CONTRAST    Result Date: 3/26/2022  CT lumbar spine without intravenous contrast medium. HISTORY: Found down. Nonresponsive. TECHNICAL FACTORS: CT lumbar spine obtained and formatted as 2.5 mm contiguous axial images from skull base to the level of. Sagittal and coronal reconstructions were obtained during postprocessing. No contrast medium was utilized. CT abdomen and pelvis, reported separately. COMPARISON: None FINDINGS: Lumbar vertebral bodies are normal in height and alignment. Disc spaces preserved. No fractures, dislocations, bone lesions. Combination facet hypertrophy, diffuse disc bulge, and ligamentum flavum hypertrophy, results in moderate central stenosis, L4-5. No fracture. Moderate central stenosis, L4-5. All CT scans at this facility use dose modulation, iterative reconstruction, and/or weight based dosing when appropriate to reduce radiation dose to as low as reasonably achievable.      CT ABDOMEN PELVIS W IV CONTRAST Additional Contrast? None    Result Date: 3/26/2022  CT OF THE CHEST, ABDOMEN, AND PELVIS with intravenous contrast medium. HISTORY: Found down. Not responsive. TECHNICAL FACTORS: CT imaging of the chest, abdomen, and pelvis, was obtained and formatted as 5 mm contiguous axial images from the thoracic inlet through the symphysis pubis. Sagittal and coronal reconstructions obtained during postprocessing. Intravenous contrast medium:  100 ml of Isovue-300 Comparison:   None  CT OF THE CHEST FINDINGS: Right lung: No nodules, masses, consolidation, pleural effusion, pneumothorax. Trace dependent subsegmental atelectatic change. Left lung: No nodules, masses, consolidation, pleural effusion, pneumothorax. Trace dependent subsegmental atelectatic change. Lymph nodes: No hilar, mediastinal, or axillary lymph node enlargement. Thoracic aorta: Normal in course and caliber. Cardiac Size: Normal. Pericardial effusion: None. Musculoskeletal:No osteoblastic, and no osteolytic lesions. Remote left fourth rib fracture. Trace dependent subsegmental atelectatic change. Remote left fourth rib fracture. CT OF THE ABDOMEN AND PELVIS WITH INTRAVENOUS CONTRAST MEDIUM. FINDINGS: Liver:  Normal in size, and shape. 6 x 8 mm cystlike area, anterior left hepatic lobe. Bile Ducts:  Normal in caliber. Gallbladder:  No stones or wall thickening. Pancreas:  Normal without masses, cysts, ductal dilatation or calcification. Spleen:  Normal in size without masses or calcifications. No splenules. Kidneys:  Normal in size and enhancement. No hydronephrosis, or stones. Small bilateral parapelvic cysts. There Adrenals:  Normal. Small bowel:  Normal in caliber. Appendix:  Not visualized. Colon:  Normal in caliber. Peritoneum:  No free air. 4.2 x 3.6 x 3.6 cm area of cystic change, with high attenuation rim in its dependent margin, left upper lobe (series 2, image 55, series 601, image 35).  Finding is separate from spleen located laterally, kidney located effects for the  proposed medication treatment. Patient is consenting to the treatment.     Electronically signed by Anil Rice MD on 6/9/2022 at 5:49 PM

## 2024-05-02 ENCOUNTER — TELEPHONE (OUTPATIENT)
Dept: SURGERY | Age: 74
End: 2024-05-02

## 2024-05-02 NOTE — TELEPHONE ENCOUNTER
Gloria from [facility] called regarding this patient.  The NH was requesting the patient have a feeding tube placed.  They stated that over the past year or so she has lost 30 lbs, she doesn't want to eat.  She does not have any swallowing issues. She did have an NG tube in the past but it was removed because she could eat.   They NH has tried Marinol, fortified foods and Boost 240 oz TID. She is currently taking a low dose of Remeron in the PM.  A swallowing evaluation was not done because she does not have issues swallowing.  She is on a regular diet with mechanical soft texture.  She is not on any blood thinners, she has no behavior issues..  She does have multiple sclerosis.    I spoke to  about this patient and she reviewed the records the NH faxed over to us.   recommends no tube be placed at this time.  Recommend NH liberate her diet...give her more things  she likes and wants since she is able to swallow.  2.  Give her the Remeron in the AM instead of the PM,  up the dose a little. Its peak effect right now would   be while the patient is sleeping which is not helping   her.  3.   Do a calorie count.  4.   Have Psychiatry talk to her as to why she does not want  to eat.    I called Gloria at [facility] ([phone]) and gave her this information.

## 2024-11-26 NOTE — TELEPHONE ENCOUNTER
Advised caregiver and pended script Called and spoke with Nazario with [facility]. Informed  him that Dr. Ochoa did cancel pcp medication for norco's. He stated they seen It but the pt is steady entering in refill request on the [facility] guille. Informed his that Mr. Stone has already been informed about medication. He verbalized understanding.